# Patient Record
Sex: MALE | Race: WHITE | Employment: UNEMPLOYED | URBAN - METROPOLITAN AREA
[De-identification: names, ages, dates, MRNs, and addresses within clinical notes are randomized per-mention and may not be internally consistent; named-entity substitution may affect disease eponyms.]

---

## 2018-07-26 ENCOUNTER — NURSE TRIAGE (OUTPATIENT)
Dept: NURSING | Facility: CLINIC | Age: 63
End: 2018-07-26

## 2018-07-26 NOTE — TELEPHONE ENCOUNTER
Blood sugars running higher.. Wants to take 1000 mg metformin in the morning and 500 mg in the evening. Blood sugars have been higher since surgery.  I advised that he needs to call and discuss changes with his MD before making changes because they may want him to get more frequent blood sugars to closely monitor his response and to get him a new prescription so he doesn't run out of the medication. He understands and will call the clinic when they open.  Kathleen Wayne RN-[facility]

## 2019-10-01 ENCOUNTER — HEALTH MAINTENANCE LETTER (OUTPATIENT)
Age: 64
End: 2019-10-01

## 2019-12-15 ENCOUNTER — HEALTH MAINTENANCE LETTER (OUTPATIENT)
Age: 64
End: 2019-12-15

## 2020-03-22 ENCOUNTER — HEALTH MAINTENANCE LETTER (OUTPATIENT)
Age: 65
End: 2020-03-22

## 2020-07-17 ENCOUNTER — TELEPHONE (OUTPATIENT)
Dept: NEUROLOGY | Facility: CLINIC | Age: 65
End: 2020-07-17

## 2020-07-17 NOTE — TELEPHONE ENCOUNTER
Called to make his September appt but wanted to know what to do about his Restless leg syndrome. He has only gotten a few hours of sleep the last few nights and feels like he is in a nightmare. Please call to advise at [phone] Thank you!

## 2020-07-20 NOTE — TELEPHONE ENCOUNTER
Spoke with patient and he states that the RLS has worsened to such a bad intensity increased even more so in the last few weeks. He states he is afraid to drive because he is not getting enough/any sleep at night because of the pain. Sometimes rosario enough to get 4 hours or less. He did start taking an otc magnesium supp (states low dose but unsure of strength) has helped minimally but is better. He does not want opiates. Is aware of 6 month follow up in September. Please advise on anything he can try in the mean time   Omid Powell on 7/20/2020 at 1:43 PM

## 2020-07-20 NOTE — TELEPHONE ENCOUNTER
Please call, can try some sinemet for the RLS, I sent eRx to the pharmacy (through [facility]). Thanks.

## 2020-07-20 NOTE — TELEPHONE ENCOUNTER
Spoke with patient and relayed message. He verbalized understanding with no further questions   Omid Powell on 7/20/2020 at 3:05 PM

## 2020-09-09 PROBLEM — R53.81 PHYSICAL DECONDITIONING: Status: ACTIVE | Noted: 2018-01-30

## 2020-09-09 PROBLEM — G25.81 RESTLESS LEGS SYNDROME: Status: ACTIVE | Noted: 2020-09-09

## 2020-09-09 PROBLEM — G25.3 MYOCLONUS: Status: ACTIVE | Noted: 2020-09-09

## 2020-09-09 PROBLEM — M41.50 SCOLIOSIS DUE TO DEGENERATIVE DISEASE OF SPINE IN ADULT PATIENT: Status: ACTIVE | Noted: 2020-09-09

## 2020-09-09 PROBLEM — R20.2 PARESTHESIA: Status: ACTIVE | Noted: 2020-09-09

## 2020-09-09 PROBLEM — M48.061 LUMBAR SPINAL STENOSIS: Status: ACTIVE | Noted: 2018-02-02

## 2020-09-09 PROBLEM — M48.04 THORACIC STENOSIS: Status: ACTIVE | Noted: 2019-09-04

## 2020-09-09 PROBLEM — G57.10 MERALGIA PARESTHETICA: Status: ACTIVE | Noted: 2020-09-09

## 2020-09-09 PROBLEM — M51.379 DDD (DEGENERATIVE DISC DISEASE), LUMBOSACRAL: Status: ACTIVE | Noted: 2020-09-09

## 2020-09-09 PROBLEM — E11.42 DIABETIC POLYNEUROPATHY (H): Status: ACTIVE | Noted: 2020-09-09

## 2020-09-09 PROBLEM — Z98.1 S/P LUMBAR SPINAL FUSION: Status: ACTIVE | Noted: 2020-09-09

## 2020-09-09 PROBLEM — I10 HTN (HYPERTENSION): Status: ACTIVE | Noted: 2018-01-16

## 2020-09-09 PROBLEM — R53.83 FATIGUE: Status: ACTIVE | Noted: 2020-09-09

## 2020-09-09 PROBLEM — R93.89 ABNORMAL MAGNETIC RESONANCE IMAGING STUDY: Status: ACTIVE | Noted: 2020-09-09

## 2020-09-10 ENCOUNTER — OFFICE VISIT (OUTPATIENT)
Dept: NEUROLOGY | Facility: CLINIC | Age: 65
End: 2020-09-10
Payer: COMMERCIAL

## 2020-09-10 VITALS — BODY MASS INDEX: 37.2 KG/M2 | WEIGHT: 237 LBS | HEIGHT: 67 IN

## 2020-09-10 DIAGNOSIS — E11.42 DIABETIC POLYNEUROPATHY ASSOCIATED WITH TYPE 2 DIABETES MELLITUS (H): Primary | ICD-10-CM

## 2020-09-10 DIAGNOSIS — G25.81 RESTLESS LEG SYNDROME: ICD-10-CM

## 2020-09-10 PROCEDURE — 99213 OFFICE O/P EST LOW 20 MIN: CPT | Mod: TEL | Performed by: PSYCHIATRY & NEUROLOGY

## 2020-09-10 RX ORDER — GABAPENTIN 600 MG/1
600 TABLET ORAL AT BEDTIME
Qty: 90 TABLET | Refills: 2 | Status: SHIPPED | OUTPATIENT
Start: 2020-09-10 | End: 2022-11-10

## 2020-09-10 RX ORDER — MULTIVITAMIN WITH IRON
1 TABLET ORAL DAILY
COMMUNITY
End: 2023-12-15

## 2020-09-10 RX ORDER — CARBIDOPA AND LEVODOPA 25; 100 MG/1; MG/1
1 TABLET ORAL 2 TIMES DAILY
Qty: 180 TABLET | Refills: 2 | Status: SHIPPED | OUTPATIENT
Start: 2020-09-10 | End: 2021-06-29

## 2020-09-10 RX ORDER — PRAMIPEXOLE DIHYDROCHLORIDE 0.5 MG/1
0.5 TABLET ORAL 3 TIMES DAILY
Qty: 270 TABLET | Refills: 2 | Status: SHIPPED | OUTPATIENT
Start: 2020-09-10 | End: 2021-06-10

## 2020-09-10 RX ORDER — GABAPENTIN 600 MG/1
1 TABLET ORAL DAILY
COMMUNITY
Start: 2020-08-14 | End: 2020-09-10

## 2020-09-10 RX ORDER — GABAPENTIN 300 MG/1
300 CAPSULE ORAL 2 TIMES DAILY
Qty: 180 CAPSULE | Refills: 2 | Status: SHIPPED | OUTPATIENT
Start: 2020-09-10 | End: 2022-11-10

## 2020-09-10 RX ORDER — GLIPIZIDE 10 MG/1
1 TABLET ORAL DAILY
COMMUNITY
Start: 2020-05-20 | End: 2022-11-10

## 2020-09-10 RX ORDER — CARBIDOPA AND LEVODOPA 25; 100 MG/1; MG/1
1 TABLET ORAL 2 TIMES DAILY
COMMUNITY
Start: 2020-08-30 | End: 2020-09-10

## 2020-09-10 ASSESSMENT — MIFFLIN-ST. JEOR: SCORE: 1828.65

## 2020-09-10 NOTE — LETTER
9/10/2020         RE: Patrick Leal  [address]  [facility] [address]        Dear Colleague,    Thank you for referring your patient, Patrick Leal, to the [facility] [place]. Please see a copy of my visit note below.    [facility]  [place]    Patrick Leal MRN# [id number]   Age: 65 year old YOB: 1955               Assessment and Plan:   Assessment:   Diabetic neuropathy  Restless leg syndrome  History of Hartmann Garrido Guillian Barré syndrome        Plan:     I renewed the Sinemet, gabapentin and pramipexole prescriptions.  Symptoms are doing well, we will plan for follow-up in 6 months.  He sees Ortho on the 21st.             Chief Complaint/HPI:     I spoke to Patrick on the phone today for a telephone visit with his permission.  I last saw him in March of this year.  In July he called us with increasing symptoms of restless legs.  With that I sent in a prescription for carbidopa levodopa 25/10, 1 tablet twice a day.  He had a remarkable improvement in his restless leg symptoms with that.  Today he complains of more lower leg pains.  He has pain in the shins and some knee pain as well.  He has had shin splints in the past and this does feel similar.  He is not having a burning type neuropathic pain.            Past Medical History:    has a past medical history of Abnormality of gait, Allergic rhinitis, cause unspecified, Anxiety state, unspecified, Depressive disorder, not elsewhere classified, DIVERTICULITIS OF COLON , recurrent  [562.11], Esophageal reflux, Generalized osteoarthrosis, unspecified site, Impotence of organic origin, Insomnia, unspecified, Myalgia and myositis, unspecified, Plantar fascial fibromatosis, PONV (postoperative nausea and vomiting), Pure hyperglyceridemia, Unspecified hypothyroidism, and Unspecified tinnitus.          Past Surgical History:    has a past surgical history that includes APPENDECTOMY; LAP,  SURG PROCTOPEXY W/SIG RESECT; Orchiectomy scrotal bilateral (4/29/2011); and Orchiectomy scrotal (10/15/2012).          Social History:     Social History     Tobacco Use     Smoking status: Never Smoker     Smokeless tobacco: Never Used   Substance Use Topics     Alcohol use: Yes     Comment: occ glass of wine              Family History:     Family History   Problem Relation Age of Onset     Heart Surgery Mother      Myocardial Infarction Mother      Heart Surgery Father                 Allergies:     Allergies   Allergen Reactions     Ambien      SLEEP WALKING     Amoxicillin      Stomach upset     Erythromycin      Other reaction(s): GI Upset     Morphine      Other reaction(s): flushed feeling, Flushing  Facial flushing       Zolpidem Other (See Comments)     Other reaction(s): Agitation, Confusion, Hallucinations  SLEEP WALKING  Sleep Walking               Medications:     Current Outpatient Medications:      aspirin 81 MG tablet, Take 81 mg by mouth daily, Disp: , Rfl:      carbidopa-levodopa (SINEMET)  MG tablet, Take 1 tablet by mouth 2 times daily, Disp: 180 tablet, Rfl: 2     Cholecalciferol (VITAMIN D) 2000 UNITS CAPS, Take 1 capsule by mouth daily , Disp: , Rfl:      chromium 200 MCG CAPS, Take 200 mcg by mouth daily., Disp: , Rfl:      cinnamon 500 MG CAPS, Take 1,000 mg by mouth 2 times daily , Disp: , Rfl:      cyanocobalamin (VITAMIN  B-12) 1000 MCG tablet, Take 1,000 mcg by mouth daily, Disp: , Rfl:      diclofenac (VOLTAREN) 1 % GEL, Place 4 g onto the skin 4 times daily, Disp: , Rfl:      DULoxetine (CYMBALTA) 60 MG capsule, Take 2 capsules (120 mg) by mouth daily, Disp: 90 capsule, Rfl: 0     estradiol (VIVELLE-DOT) 0.025 MG/24HR, Place 1 patch onto the skin twice a week, Disp: 8 patch, Rfl: 7     Fexofenadine HCl (ALLEGRA PO), Take 180 mg by mouth daily , Disp: , Rfl:      fluticasone (FLONASE) 50 MCG/ACT nasal spray, Spray 1 spray into both nostrils daily as needed for rhinitis or  allergies, Disp: , Rfl:      fluticasone (FLOVENT DISKUS) 50 MCG/BLIST AEPB, Inhale 1 puff into the lungs as needed , Disp: , Rfl:      gabapentin (NEURONTIN) 300 MG capsule, Take 1 capsule (300 mg) by mouth 2 times daily, Disp: 180 capsule, Rfl: 2     gabapentin (NEURONTIN) 300 MG tablet, Take 300 mg by mouth 2 times daily., Disp: , Rfl:      gabapentin (NEURONTIN) 600 MG tablet, Take 1 tablet (600 mg) by mouth At Bedtime, Disp: 90 tablet, Rfl: 2     glipiZIDE (GLUCOTROL) 10 MG tablet, Take 1 tablet by mouth daily, Disp: , Rfl:      ibuprofen (ADVIL,MOTRIN) 600 MG tablet, Take 1 tablet (600 mg) by mouth every 6 hours as needed for moderate pain, Disp: 20 tablet, Rfl: 1     levothyroxine (SYNTHROID, LEVOTHROID) 150 MCG tablet, Take 150 mcg by mouth daily , Disp: , Rfl:      lidocaine (LIDODERM) 5 % patch, Place 2 patches onto the skin as needed , Disp: , Rfl:      LORAZEPAM PO, Take 1 mg by mouth as needed , Disp: , Rfl:      magnesium 250 MG tablet, Take 1 tablet by mouth daily, Disp: , Rfl:      metFORMIN (GLUCOPHAGE) 1000 MG tablet, Take 1,000 mg by mouth 2 times daily Restart tomorrow (Fri), Disp: 60 tablet, Rfl:      pantoprazole (PROTONIX) 40 MG enteric coated tablet, Take 40 mg by mouth daily, Disp: , Rfl:      pramipexole (MIRAPEX) 0.5 MG tablet, Take 1 tablet (0.5 mg) by mouth 3 times daily, Disp: 270 tablet, Rfl: 2     traZODone (DESYREL) 100 MG tablet, Take 2 tablets by mouth nightly as needed , Disp: , Rfl:      vitamin E 400 UNITS TABS, Take 400 Units by mouth daily, Disp: , Rfl:            Review of Systems:               Physical Exam:   Awake and alert with no aphasia no dysarthria  Speech is clear and coherent  Further exam is not feasible over the phone.    12 minutes were spent on the phone today.      Ben Ovalles MD             Again, thank you for allowing me to participate in the care of your patient.        Sincerely,        Ben Ovalles MD

## 2020-09-10 NOTE — NURSING NOTE
Chief Complaint   Patient presents with     Follow Up     Annual RLS follow up-states bi lateral leg pain in shins and ankles has worsened since last visit      Phone visit [phone]  Omid Powell on 9/10/2020 at 8:34 AM

## 2020-09-14 NOTE — PROGRESS NOTES
[facility] Neurology  [place]    Patrick Leal MRN# [id number]   Age: 65 year old YOB: 1955               Assessment and Plan:   Assessment:   Diabetic neuropathy  Restless leg syndrome  History of Hartmann Garrido Guillian Barré syndrome        Plan:     I renewed the Sinemet, gabapentin and pramipexole prescriptions.  Symptoms are doing well, we will plan for follow-up in 6 months.  He sees Ortho on the 21st.             Chief Complaint/HPI:     I spoke to Patrick on the phone today for a telephone visit with his permission.  I last saw him in March of this year.  In July he called us with increasing symptoms of restless legs.  With that I sent in a prescription for carbidopa levodopa 25/10, 1 tablet twice a day.  He had a remarkable improvement in his restless leg symptoms with that.  Today he complains of more lower leg pains.  He has pain in the shins and some knee pain as well.  He has had shin splints in the past and this does feel similar.  He is not having a burning type neuropathic pain.            Past Medical History:    has a past medical history of Abnormality of gait, Allergic rhinitis, cause unspecified, Anxiety state, unspecified, Depressive disorder, not elsewhere classified, DIVERTICULITIS OF COLON , recurrent  [562.11], Esophageal reflux, Generalized osteoarthrosis, unspecified site, Impotence of organic origin, Insomnia, unspecified, Myalgia and myositis, unspecified, Plantar fascial fibromatosis, PONV (postoperative nausea and vomiting), Pure hyperglyceridemia, Unspecified hypothyroidism, and Unspecified tinnitus.          Past Surgical History:    has a past surgical history that includes APPENDECTOMY; LAP, SURG PROCTOPEXY W/SIG RESECT; Orchiectomy scrotal bilateral (4/29/2011); and Orchiectomy scrotal (10/15/2012).          Social History:     Social History     Tobacco Use     Smoking status: Never Smoker     Smokeless tobacco: Never Used   Substance Use Topics      Alcohol use: Yes     Comment: occ glass of wine              Family History:     Family History   Problem Relation Age of Onset     Heart Surgery Mother      Myocardial Infarction Mother      Heart Surgery Father                 Allergies:     Allergies   Allergen Reactions     Ambien      SLEEP WALKING     Amoxicillin      Stomach upset     Erythromycin      Other reaction(s): GI Upset     Morphine      Other reaction(s): flushed feeling, Flushing  Facial flushing       Zolpidem Other (See Comments)     Other reaction(s): Agitation, Confusion, Hallucinations  SLEEP WALKING  Sleep Walking               Medications:     Current Outpatient Medications:      aspirin 81 MG tablet, Take 81 mg by mouth daily, Disp: , Rfl:      carbidopa-levodopa (SINEMET)  MG tablet, Take 1 tablet by mouth 2 times daily, Disp: 180 tablet, Rfl: 2     Cholecalciferol (VITAMIN D) 2000 UNITS CAPS, Take 1 capsule by mouth daily , Disp: , Rfl:      chromium 200 MCG CAPS, Take 200 mcg by mouth daily., Disp: , Rfl:      cinnamon 500 MG CAPS, Take 1,000 mg by mouth 2 times daily , Disp: , Rfl:      cyanocobalamin (VITAMIN  B-12) 1000 MCG tablet, Take 1,000 mcg by mouth daily, Disp: , Rfl:      diclofenac (VOLTAREN) 1 % GEL, Place 4 g onto the skin 4 times daily, Disp: , Rfl:      DULoxetine (CYMBALTA) 60 MG capsule, Take 2 capsules (120 mg) by mouth daily, Disp: 90 capsule, Rfl: 0     estradiol (VIVELLE-DOT) 0.025 MG/24HR, Place 1 patch onto the skin twice a week, Disp: 8 patch, Rfl: 7     Fexofenadine HCl (ALLEGRA PO), Take 180 mg by mouth daily , Disp: , Rfl:      fluticasone (FLONASE) 50 MCG/ACT nasal spray, Spray 1 spray into both nostrils daily as needed for rhinitis or allergies, Disp: , Rfl:      fluticasone (FLOVENT DISKUS) 50 MCG/BLIST AEPB, Inhale 1 puff into the lungs as needed , Disp: , Rfl:      gabapentin (NEURONTIN) 300 MG capsule, Take 1 capsule (300 mg) by mouth 2 times daily, Disp: 180 capsule, Rfl: 2     gabapentin  (NEURONTIN) 300 MG tablet, Take 300 mg by mouth 2 times daily., Disp: , Rfl:      gabapentin (NEURONTIN) 600 MG tablet, Take 1 tablet (600 mg) by mouth At Bedtime, Disp: 90 tablet, Rfl: 2     glipiZIDE (GLUCOTROL) 10 MG tablet, Take 1 tablet by mouth daily, Disp: , Rfl:      ibuprofen (ADVIL,MOTRIN) 600 MG tablet, Take 1 tablet (600 mg) by mouth every 6 hours as needed for moderate pain, Disp: 20 tablet, Rfl: 1     levothyroxine (SYNTHROID, LEVOTHROID) 150 MCG tablet, Take 150 mcg by mouth daily , Disp: , Rfl:      lidocaine (LIDODERM) 5 % patch, Place 2 patches onto the skin as needed , Disp: , Rfl:      LORAZEPAM PO, Take 1 mg by mouth as needed , Disp: , Rfl:      magnesium 250 MG tablet, Take 1 tablet by mouth daily, Disp: , Rfl:      metFORMIN (GLUCOPHAGE) 1000 MG tablet, Take 1,000 mg by mouth 2 times daily Restart tomorrow (Fri), Disp: 60 tablet, Rfl:      pantoprazole (PROTONIX) 40 MG enteric coated tablet, Take 40 mg by mouth daily, Disp: , Rfl:      pramipexole (MIRAPEX) 0.5 MG tablet, Take 1 tablet (0.5 mg) by mouth 3 times daily, Disp: 270 tablet, Rfl: 2     traZODone (DESYREL) 100 MG tablet, Take 2 tablets by mouth nightly as needed , Disp: , Rfl:      vitamin E 400 UNITS TABS, Take 400 Units by mouth daily, Disp: , Rfl:            Review of Systems:               Physical Exam:   Awake and alert with no aphasia no dysarthria  Speech is clear and coherent  Further exam is not feasible over the phone.    12 minutes were spent on the phone today.      Ben Ovalles MD

## 2020-09-14 NOTE — PATIENT INSTRUCTIONS
Patient Education     Understanding Restless Legs Syndrome    Are you ever annoyed by a creeping or itching feeling in your legs? Do you often feel an urge to move your legs while sitting or lying in bed? This can keep you from falling asleep at night. You may then feel tired during the day. If you have these problems, talk to your healthcare provider. He or she can suggest a treatment plan and help you find ways to sleep better.  Restless legs syndrome (RLS)  RLS is a creeping, crawly, or jumpy feeling in the legs with an urge to move them. Symptoms of RLS often occur during periods of inactivity, such as when you sit or lie down at night. This discomfort can keep you from falling asleep. RLS is more common in older people and tends to run in families. Overuse of caffeine or alcohol may make symptoms worse. Iron deficiency, diabetes, or kidney problems can contribute to RLS.  Periodic limb movement syndrome (PLMS)  PLMS is sudden, repetitive leg jerking during sleep. The person you sleep with is often the one who notices it. Your legs may jerk many times during the night. You and your partner may both have trouble sleeping and feel tired in the morning. PLMS shouldn t be confused with the normal leg or body twitching many people have when first falling asleep.  Treating these problems  If these problems are causing disrupted sleep and daytime symptoms, treatment may be needed. Possible treatments may include:    Avoiding medicines like antidepressants, antinausea medicine, and other medicines that block dopamine    Iron supplements    Prescribed medicines including pramipexole, ropinirole, ritigotine, pregbalin, cabergoline, levodopa, and clonidine    Lifestyle changes, such as regular exercise, controlling caffeine intake, alcohol, and smoking  Date Last Reviewed: 9/1/2017 2000-2019 [employer] [address] [place] [address], [place] [address]. All rights reserved. This information is not intended as a  substitute for professional medical care. Always follow your healthcare professional's instructions.

## 2021-01-15 ENCOUNTER — HEALTH MAINTENANCE LETTER (OUTPATIENT)
Age: 66
End: 2021-01-15

## 2021-03-02 ENCOUNTER — TELEPHONE (OUTPATIENT)
Dept: NEUROLOGY | Facility: CLINIC | Age: 66
End: 2021-03-02

## 2021-03-02 NOTE — TELEPHONE ENCOUNTER
Pt,called to ask if the COVID vaccine was safe for him to get. He has a Hx of GBS.  [phone] ok to lm

## 2021-03-03 NOTE — TELEPHONE ENCOUNTER
I spoke to Patrick on the phone today, I told him we do not have clear data about risk of neuromuscular complications from the Covid vaccine.  It is unlikely he would get problems from it but I cannot say for sure that it would not happen.  I did suggest that if he's worried about it he could skip the vaccine and use precautions such as masking when out and about.  He will think about it.

## 2021-05-15 ENCOUNTER — HEALTH MAINTENANCE LETTER (OUTPATIENT)
Age: 66
End: 2021-05-15

## 2021-06-10 DIAGNOSIS — G25.81 RESTLESS LEG SYNDROME: ICD-10-CM

## 2021-06-10 DIAGNOSIS — E11.42 DIABETIC POLYNEUROPATHY ASSOCIATED WITH TYPE 2 DIABETES MELLITUS (H): ICD-10-CM

## 2021-06-10 RX ORDER — PRAMIPEXOLE DIHYDROCHLORIDE 0.5 MG/1
0.5 TABLET ORAL 3 TIMES DAILY
Qty: 270 TABLET | Refills: 0 | Status: SHIPPED | OUTPATIENT
Start: 2021-06-10 | End: 2021-09-07

## 2021-06-10 NOTE — TELEPHONE ENCOUNTER
Refill request for pramipexole  Medication T'd for review and signature  Devora Ambrose CMA on 6/10/2021 at 11:33 AM

## 2021-06-29 DIAGNOSIS — E11.42 DIABETIC POLYNEUROPATHY ASSOCIATED WITH TYPE 2 DIABETES MELLITUS (H): ICD-10-CM

## 2021-06-29 DIAGNOSIS — G25.81 RESTLESS LEG SYNDROME: ICD-10-CM

## 2021-06-29 RX ORDER — CARBIDOPA AND LEVODOPA 25; 100 MG/1; MG/1
1 TABLET ORAL 2 TIMES DAILY
Qty: 180 TABLET | Refills: 0 | Status: SHIPPED | OUTPATIENT
Start: 2021-06-29 | End: 2021-10-01

## 2021-06-29 NOTE — TELEPHONE ENCOUNTER
Refill request for Sinemet  Medication T'd for review and signature  Devora Ambrose CMA on 6/29/2021 at 9:27 AM

## 2021-09-04 ENCOUNTER — HEALTH MAINTENANCE LETTER (OUTPATIENT)
Age: 66
End: 2021-09-04

## 2021-09-04 DIAGNOSIS — G25.81 RESTLESS LEG SYNDROME: ICD-10-CM

## 2021-09-04 DIAGNOSIS — E11.42 DIABETIC POLYNEUROPATHY ASSOCIATED WITH TYPE 2 DIABETES MELLITUS (H): ICD-10-CM

## 2021-09-04 NOTE — LETTER
9/4/2021        RE: Patrick Leal  [address]  [facility] [address]            Dear Patrick,      .We recently provided you with medication refills.  Many medications require routine follow-up with your doctor. As Dr. Ovalles is no longer at our clinic, you will need to establish care with another neurologist for medication management.     Your prescription(s) have been refilled for 30 days so you may have time for the above noted follow-up. Please call to schedule soon so we can assure you have an appointment before your next refills are needed. If you have already made a follow up appointment, please disregard this letter.           Sincerely,        [facility]     (Formerly known as [facility])

## 2021-09-07 NOTE — TELEPHONE ENCOUNTER
Refill request for Mirapex  Letter mailed to patient to schedule follow up appt  Medication T'd for review and signature  Devora Ambrose CMA on 9/7/2021 at 3:44 PM

## 2021-09-08 RX ORDER — PRAMIPEXOLE DIHYDROCHLORIDE 0.5 MG/1
0.5 TABLET ORAL 3 TIMES DAILY
Qty: 90 TABLET | Refills: 0 | Status: SHIPPED | OUTPATIENT
Start: 2021-09-08 | End: 2022-01-19

## 2021-09-25 DIAGNOSIS — G25.81 RESTLESS LEG SYNDROME: ICD-10-CM

## 2021-09-25 DIAGNOSIS — E11.42 DIABETIC POLYNEUROPATHY ASSOCIATED WITH TYPE 2 DIABETES MELLITUS (H): ICD-10-CM

## 2021-10-01 RX ORDER — CARBIDOPA AND LEVODOPA 25; 100 MG/1; MG/1
1 TABLET ORAL 2 TIMES DAILY
Qty: 60 TABLET | Refills: 0 | Status: SHIPPED | OUTPATIENT
Start: 2021-10-01 | End: 2022-01-19

## 2021-10-01 NOTE — TELEPHONE ENCOUNTER
Carbidopa / Levodopa refill request received 9- for previous Dr. Ovalles patient with last visit 9-. Appointment reminder letter was mailed to patient 9-7-2021. There is no appointment scheduled at this time.  30 day supply with no refills pended.  Medication T'd for review and signature  Alexia Isabel RN on 10/1/2021 at 11:52 AM

## 2021-10-06 ENCOUNTER — TELEPHONE (OUTPATIENT)
Dept: NEUROLOGY | Facility: CLINIC | Age: 66
End: 2021-10-06
Payer: COMMERCIAL

## 2021-10-06 NOTE — TELEPHONE ENCOUNTER
I called pt to schedule with a provider to continue refills, as we got a request from [facility] pharmacy. Pt stated that he is getting neuro care elsewhere. Pt will let [facility] know not to send requests to us.

## 2021-12-19 ENCOUNTER — HEALTH MAINTENANCE LETTER (OUTPATIENT)
Age: 66
End: 2021-12-19

## 2022-01-17 NOTE — TELEPHONE ENCOUNTER
RECORDS RECEIVED FROM: Self    REASON FOR VISIT: RLS   Date of Appt: 1/19/22   NOTES (FOR ALL VISITS) STATUS DETAILS   OFFICE NOTE from referring provider N/A    OFFICE NOTE from other specialist Internal Dr Ben Ovalles @ [facility] [place]:  9/10/20   DISCHARGE SUMMARY from hospital N/A    DISCHARGE REPORT from the ER N/A    OPERATIVE REPORT N/A    MEDICATION LIST Internal    IMAGING  (FOR ALL VISITS)     EMG N/A    EEG N/A    LUMBAR PUNCTURE N/A    PEPITO SCAN N/A    ULTRASOUND (CAROTID BILAT) *VASCULAR* N/A    MRI (HEAD, NECK, SPINE) Received [facility]:  MRI Thoracic Spine 10/23/15  MRI Brain 10/22/15  MRI Brain 10/17/15   CT (HEAD, NECK, SPINE) Received [facility]:  CT Thoracic Spine 9/14/19  CT Lumbar Spine 9/14/19  CT Thoracic Spine 9/8/19    [facility]:  CTA Head 10/17/15  CT Head 10/17/15      Action 1/17/22 MV 2.40pm   Action Taken Imaging request faxed to [facility]    --1/18/22 MV 6.49am--  Images resolved in PACS

## 2022-01-19 ENCOUNTER — OFFICE VISIT (OUTPATIENT)
Dept: NEUROLOGY | Facility: CLINIC | Age: 67
End: 2022-01-19
Payer: COMMERCIAL

## 2022-01-19 ENCOUNTER — PRE VISIT (OUTPATIENT)
Dept: NEUROLOGY | Facility: CLINIC | Age: 67
End: 2022-01-19

## 2022-01-19 VITALS — RESPIRATION RATE: 16 BRPM

## 2022-01-19 DIAGNOSIS — G25.81 RESTLESS LEG SYNDROME: ICD-10-CM

## 2022-01-19 DIAGNOSIS — E11.42 DIABETIC POLYNEUROPATHY ASSOCIATED WITH TYPE 2 DIABETES MELLITUS (H): ICD-10-CM

## 2022-01-19 PROCEDURE — 99215 OFFICE O/P EST HI 40 MIN: CPT | Performed by: PSYCHIATRY & NEUROLOGY

## 2022-01-19 RX ORDER — LANCETS
EACH MISCELLANEOUS
COMMUNITY
Start: 2021-12-11

## 2022-01-19 RX ORDER — CALCIUM CARBONATE 1250 MG/5ML
SUSPENSION ORAL
COMMUNITY
End: 2024-01-18

## 2022-01-19 RX ORDER — BLOOD SUGAR DIAGNOSTIC
STRIP MISCELLANEOUS
COMMUNITY
Start: 2021-12-10

## 2022-01-19 RX ORDER — DIAZEPAM 5 MG
TABLET ORAL
COMMUNITY
Start: 2021-02-17 | End: 2024-01-25

## 2022-01-19 RX ORDER — ASPIRIN 81 MG/1
81 TABLET ORAL DAILY
COMMUNITY

## 2022-01-19 RX ORDER — CARBIDOPA AND LEVODOPA 25; 100 MG/1; MG/1
1 TABLET ORAL 2 TIMES DAILY
Qty: 180 TABLET | Refills: 3 | Status: SHIPPED | OUTPATIENT
Start: 2022-01-19 | End: 2022-11-10

## 2022-01-19 RX ORDER — PRAMIPEXOLE DIHYDROCHLORIDE 0.5 MG/1
TABLET ORAL
Qty: 270 TABLET | Refills: 3 | Status: SHIPPED | OUTPATIENT
Start: 2022-01-19 | End: 2022-11-10

## 2022-01-19 RX ORDER — LOSARTAN POTASSIUM 25 MG/1
25 TABLET ORAL DAILY
COMMUNITY
Start: 2021-04-22 | End: 2022-11-10

## 2022-01-19 RX ORDER — BLOOD-GLUCOSE METER
EACH MISCELLANEOUS
COMMUNITY
Start: 2021-12-11

## 2022-01-19 RX ORDER — ATORVASTATIN CALCIUM 10 MG/1
1 TABLET, FILM COATED ORAL DAILY
COMMUNITY
Start: 2021-12-07

## 2022-01-19 ASSESSMENT — PAIN SCALES - GENERAL: PAINLEVEL: NO PAIN (0)

## 2022-01-19 NOTE — PATIENT INSTRUCTIONS
I have sent in prescriptions for pramipexole and carbidopa/levodopa for your restless legs.    We also discussed the fact that poor blood sugar control can worsen your restless legs symptoms and exacerbate peripheral neuropathy, so working hard on your diabetes control should also help your restless legs syndrome.

## 2022-01-19 NOTE — PROGRESS NOTES
[facility]  Movement Disorders Division   Initial Clinic Evaluation     Patient: Patrick Leal   MRN: [id number]   : 1955   Date of Visit: 2022     Referring Physician: ***    Dear  ***, Thank you for your referral of Patrick Leal for ***    History of Present Illness  Mr. 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 He has previously been followed by Dr. Ovalles for his RLS and treated with a combination of gabapentin, pramipexole, and Sinemet.     Had Guillain-[place] in , requiring a 10 day hospitalization followed by rehab for 2 weeks. He presented with facial weakness, and lower extremity weakness. Prior to the GBS he was walking at least 5 miles per day, but since then he has not been able to walk more than a mile, or less with his        He has had restless legs syndrome since around , but he did not start treatment for it until after he had Guillain [place]. He was initially started on pramipexole.  Carbidopa/levodopa was added about 1-1.5 years ago when he had worsening of his restless legs again.    He was initially on gabapentin for his back pain, but the bedtime dose was increase for RLS.     He ran out of his pramipexole and carbidopa/levodopa 2-3 weeks ago and has been really struggling with his RLS since then with severe difficulty sleeping. He is only sleeping 2-3 hours per night.      On this current medication regimen he still has some bad nights, but in general will get 4-6 hours of sleep. RLS symptoms may start any time after dinner, but usually start 930-10PM. The bedtime medication reduces his symptoms but does not take them away.     He will be having a lumbar back surgery on  to repair a jerrica.     Review of Systems:  Other than that noted at the end of this note, the remainder of 12 systems reviewed were negative.    Medications:  Current Outpatient Medications    Medication Sig Dispense Refill     aspirin 81 MG tablet Take 81 mg by mouth daily       carbidopa-levodopa (SINEMET)  MG tablet Take 1 tablet by mouth 2 times daily. 60 tablet 0     Cholecalciferol (VITAMIN D) 2000 UNITS CAPS Take 1 capsule by mouth daily        chromium 200 MCG CAPS Take 200 mcg by mouth daily.       cinnamon 500 MG CAPS Take 1,000 mg by mouth 2 times daily        cyanocobalamin (VITAMIN  B-12) 1000 MCG tablet Take 1,000 mcg by mouth daily       diclofenac (VOLTAREN) 1 % GEL Place 4 g onto the skin 4 times daily       DULoxetine (CYMBALTA) 60 MG capsule Take 2 capsules (120 mg) by mouth daily 90 capsule 0     estradiol (VIVELLE-DOT) 0.025 MG/24HR Place 1 patch onto the skin twice a week 8 patch 7     Fexofenadine HCl (ALLEGRA PO) Take 180 mg by mouth daily        fluticasone (FLONASE) 50 MCG/ACT nasal spray Spray 1 spray into both nostrils daily as needed for rhinitis or allergies       fluticasone (FLOVENT DISKUS) 50 MCG/BLIST AEPB Inhale 1 puff into the lungs as needed        gabapentin (NEURONTIN) 300 MG capsule Take 1 capsule (300 mg) by mouth 2 times daily 180 capsule 2     gabapentin (NEURONTIN) 300 MG tablet Take 300 mg by mouth 2 times daily.       gabapentin (NEURONTIN) 600 MG tablet Take 1 tablet (600 mg) by mouth At Bedtime 90 tablet 2     glipiZIDE (GLUCOTROL) 10 MG tablet Take 1 tablet by mouth daily       ibuprofen (ADVIL,MOTRIN) 600 MG tablet Take 1 tablet (600 mg) by mouth every 6 hours as needed for moderate pain 20 tablet 1     levothyroxine (SYNTHROID, LEVOTHROID) 150 MCG tablet Take 150 mcg by mouth daily        lidocaine (LIDODERM) 5 % patch Place 2 patches onto the skin as needed        LORAZEPAM PO Take 1 mg by mouth as needed        magnesium 250 MG tablet Take 1 tablet by mouth daily       metFORMIN (GLUCOPHAGE) 1000 MG tablet Take 1,000 mg by mouth 2 times daily Restart tomorrow (Fri) 60 tablet      pantoprazole (PROTONIX) 40 MG enteric coated tablet Take 40 mg by  mouth daily       pramipexole (MIRAPEX) 0.5 MG tablet Take 1 tablet (0.5 mg) by mouth 3 times daily PLEASE CALL TO SCHEDULE FOLLOW UP APPT 90 tablet 0     traZODone (DESYREL) 100 MG tablet Take 2 tablets by mouth nightly as needed        vitamin E 400 UNITS TABS Take 400 Units by mouth daily            Movement Disorder-related Medications                   9:30AM PM 1PM At bedtime (10AM)    Pramipexole 0.5mg                                                1   2    Gabapentin 300mg  1  1 (often forgets)         Gabapentin 600mg                   1    Carbidopa/levodopa IR 25/100 1  1 1    Duloxetine 60mg 2             Allergies: is allergic to ambien, amoxicillin, erythromycin, morphine, and zolpidem.    Past Medical History:   Past Medical History:   Diagnosis Date     Abnormality of gait      Allergic rhinitis, cause unspecified      Anxiety state, unspecified      Depressive disorder, not elsewhere classified      DIVERTICULITIS OF COLON , recurrent  [562.11]      Esophageal reflux     Question of     Generalized osteoarthrosis, unspecified site      Impotence of organic origin     on testosterone tx     Insomnia, unspecified      Myalgia and myositis, unspecified      Plantar fascial fibromatosis      PONV (postoperative nausea and vomiting)      Pure hyperglyceridemia      Unspecified hypothyroidism      Unspecified tinnitus        Past Surgical History:   Past Surgical History:   Procedure Laterality Date     ORCHIECTOMY SCROTAL  10/15/2012    Procedure: ORCHIECTOMY SCROTAL;  Scrotal Resection ;  Surgeon: Kang Guajardo MD;  Location: [facility] OR     ORCHIECTOMY SCROTAL BILATERAL  4/29/2011    Procedure:ORCHIECTOMY SCROTAL BILATERAL; Surgeon requests Choice; Surgeon:KANG GUAJARDO; Location: OR     [facility] APPENDECTOMY       ZC LAP, SURG PROCTOPEXY W/SIG RESECT      sigmoid resection for diverticulitis       Social History:   Social History     Socioeconomic History     Marital status:      Spouse name:  Not on file     Number of children: Not on file     Years of education: Not on file     Highest education level: Not on file   Occupational History     Not on file   Tobacco Use     Smoking status: Never Smoker     Smokeless tobacco: Never Used   Substance and Sexual Activity     Alcohol use: Yes     Comment: occ glass of wine      Drug use: No     Sexual activity: Yes     Partners: Female   Other Topics Concern     Parent/sibling w/ CABG, MI or angioplasty before 65F 55M? 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 On son in [place] (due to return home early 2008)    Amish: Mormonism    Pets: 2 dogs     Social Determinants of Health     Financial Resource Strain: Not on file   Food Insecurity: Not on file   Transportation Needs: Not on file   Physical Activity: Not on file   Stress: Not on file   Social Connections: Not on file   Intimate Partner Violence: Not on file   Housing Stability: Not on file       Family History:  Family History   Problem Relation Age of Onset     Heart Surgery Mother      Myocardial Infarction Mother      Heart Surgery Father             Physical Exam:  The patient's  vitals were not taken for this visit.    Neurological Examination:     Full strength in all four extremities.   Normal tone.  Gait is cautious and somewhat antalgic.    Absent reflexes at biceps, trace at left brachioradialis. Absent in knees, ankles.     Data Reviewed:        Impression:  Patrick Leal is a 66 year old male with ***     Recommendations:     Time Spent: *** minutes spent on the date of the encounter doing chart review, history and exam, documentation and further activities as noted above    CC: ***    Thank you for the opportunity to share in the care of this patient.  Please feel free to contact me if you have any further questions or comments.    Sincerely,    Anika Wilson MD

## 2022-01-19 NOTE — LETTER
2022       RE: Patrick Leal  [address]  [facility] [address]     Dear Colleague,    Thank you for referring your patient, Patrick Leal, to the [facility] [place] at [facility]. Please see a copy of my visit note below.    Department of Neurology  Movement Disorders Division   Initial Clinic Evaluation     Patient: Patrick Leal   MRN: [id number]   : 1955   Date of Visit: 2022     Chief Complaint: Establishing care for restless legs syndrome    History of Present Illness  Mr. 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 He has previously been followed by Dr. Ovalles for his RLS and treated with a combination of gabapentin, pramipexole, and Sinemet.     Had Guillain-[place] in , requiring a 10 day hospitalization followed by rehab for 2 weeks. He presented with facial weakness, and lower extremity weakness. Prior to the GBS he was walking at least 5 miles per day, but since then he has not been able to walk more than a mile, or less with his      He has had restless legs syndrome since around , but he did not start treatment for it until after he had Guillain [place]. He was initially started on pramipexole.  Carbidopa/levodopa was added about 1-1.5 years ago when he had worsening of his restless legs again.    He was initially on gabapentin for his back pain, but the bedtime dose was increase for RLS.     He ran out of his pramipexole and carbidopa/levodopa 2-3 weeks ago and has been really struggling with his RLS since then with severe difficulty sleeping. He is only sleeping 2-3 hours per night.      On this current medication regimen he still has some bad nights, but in general will get 4-6 hours of sleep. RLS symptoms may start any time after dinner, but usually start 930-10PM. The bedtime medication reduces his  symptoms but does not take them away.     He will be having a lumbar back surgery on 1/21 to repair a jerrica.     Review of Systems:  Other than that noted at the end of this note, the remainder of 12 systems reviewed were negative.    Medications:  Current Outpatient Medications   Medication Sig Dispense Refill     aspirin 81 MG EC tablet Take 81 mg by mouth daily       atorvastatin (LIPITOR) 10 MG tablet Take 1 tablet by mouth daily       blood glucose (OPTIUM TEST STRIPS) test strip Dispense item covered by pt ins. E11.9 NIDDM type II - Test 1 time/day       blood glucose monitoring (ACCU-CHEK FASTCLIX) lancets Use as directed. Test 1 times per day.       calcium carbonate 1250 MG/5ML SUSP suspension        carbidopa-levodopa (SINEMET)  MG tablet Take 1 tablet by mouth 2 times daily 180 tablet 3     Cholecalciferol (VITAMIN D) 2000 UNITS CAPS Take 1 capsule by mouth daily        chromium 200 MCG CAPS Take 200 mcg by mouth daily.       cinnamon 500 MG CAPS Take 1,000 mg by mouth 2 times daily        Contour Next EZ (CONTOUR NEXT EZ W/DEVICE KIT) w/Device KIT USE TO TEST BLOOD SUGARS 1 TIME DAILY.  E11.9 NIDDM TYPE II       diazepam (VALIUM) 5 MG tablet Take 1 tablet by mouth 2 times daily if needed for Muscle Spasm.       diclofenac (VOLTAREN) 1 % GEL Place 4 g onto the skin 4 times daily       DULoxetine (CYMBALTA) 60 MG capsule Take 2 capsules (120 mg) by mouth daily 90 capsule 0     estradiol (VIVELLE-DOT) 0.025 MG/24HR Place 1 patch onto the skin twice a week 8 patch 7     Fexofenadine HCl (ALLEGRA PO) Take 180 mg by mouth daily        fluticasone (FLONASE) 50 MCG/ACT nasal spray Spray 1 spray into both nostrils daily as needed for rhinitis or allergies       fluticasone (FLOVENT DISKUS) 50 MCG/BLIST AEPB Inhale 1 puff into the lungs as needed        gabapentin (NEURONTIN) 300 MG capsule Take 1 capsule (300 mg) by mouth 2 times daily 180 capsule 2     gabapentin (NEURONTIN) 600 MG tablet Take 1 tablet (600  mg) by mouth At Bedtime 90 tablet 2     glipiZIDE (GLUCOTROL) 10 MG tablet Take 1 tablet by mouth daily       levothyroxine (SYNTHROID, LEVOTHROID) 150 MCG tablet Take 150 mcg by mouth daily        lidocaine (LIDODERM) 5 % patch Place 2 patches onto the skin as needed        LORAZEPAM PO Take 1 mg by mouth as needed        losartan (COZAAR) 25 MG tablet Take 25 mg by mouth daily       magnesium 250 MG tablet Take 1 tablet by mouth daily       metFORMIN (GLUCOPHAGE) 1000 MG tablet Take 1,000 mg by mouth 2 times daily Restart tomorrow (Fri) 60 tablet      pantoprazole (PROTONIX) 40 MG enteric coated tablet Take 40 mg by mouth daily       pramipexole (MIRAPEX) 0.5 MG tablet Take 1 tab po 1AM and 2 tabs po qhs 270 tablet 3     traZODone (DESYREL) 100 MG tablet Take 2 tablets by mouth nightly as needed        vitamin E 400 UNITS TABS Take 400 Units by mouth daily            Movement Disorder-related Medications                   9:30AM PM 1PM At bedtime (10AM)    Pramipexole 0.5mg                                                1   2    Gabapentin 300mg  1  1 (often forgets)         Gabapentin 600mg                   1    Carbidopa/levodopa IR 25/100 1  1 1    Duloxetine 60mg 2             Allergies: is allergic to ambien, amoxicillin, bacitracin, erythromycin, morphine, and zolpidem.    Past Medical History:   Past Medical History:   Diagnosis Date     Abnormality of gait      Allergic rhinitis, cause unspecified      Anxiety state, unspecified      Depressive disorder, not elsewhere classified      DIVERTICULITIS OF COLON , recurrent  [562.11]      Esophageal reflux     Question of     Generalized osteoarthrosis, unspecified site      Impotence of organic origin     on testosterone tx     Insomnia, unspecified      Myalgia and myositis, unspecified      Plantar fascial fibromatosis      PONV (postoperative nausea and vomiting)      Pure hyperglyceridemia      Unspecified hypothyroidism      Unspecified tinnitus         Past Surgical History:   Past Surgical History:   Procedure Laterality Date     ORCHIECTOMY SCROTAL  10/15/2012    Procedure: ORCHIECTOMY SCROTAL;  Scrotal Resection ;  Surgeon: Kang Guajardo MD;  Location: [facility] OR     ORCHIECTOMY SCROTAL BILATERAL  4/29/2011    Procedure:ORCHIECTOMY SCROTAL BILATERAL; Surgeon requests Choice; Surgeon:KANG GUAJARDO; Location:[facility] OR     [facility]C APPENDECTOMY       ZZC LAP, SURG PROCTOPEXY W/SIG RESECT      sigmoid resection for diverticulitis       Social History:   Social History     Socioeconomic History     Marital status:      Spouse name: Not on file     Number of children: Not on file     Years of education: Not on file     Highest education level: Not on file   Occupational History     Not on file   Tobacco Use     Smoking status: Never Smoker     Smokeless tobacco: Never Used   Substance and Sexual Activity     Alcohol use: Yes     Comment: occ glass of wine      Drug use: No     Sexual activity: Yes     Partners: Female   Other Topics Concern     Parent/sibling w/ CABG, MI or angioplasty before 65F 55M? 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 On son in [place] (due to return home early 2008)    Mandaeism: Buddhist    Pets: 2 dogs     Social Determinants of Health     Financial Resource Strain: Not on file   Food Insecurity: Not on file   Transportation Needs: Not on file   Physical Activity: Not on file   Stress: Not on file   Social Connections: Not on file   Intimate Partner Violence: Not on file   Housing Stability: Not on file       Family History:  Family History   Problem Relation Age of Onset     Heart Surgery Mother      Myocardial Infarction Mother      Heart Surgery Father             Physical Exam:  The patient's  respiration is 16.    Neurological Examination:   He is alert and oriented and has fluent speech without dysarthria and is able to provide an interval medical history. Extraocular  movements are full. He has normal smooth pursuits and saccades. His face is symmetric with equal activation.   Full strength in all four extremities. No involuntary movements on exam today.   Normal tone.  Gait is cautious and somewhat antalgic.    Absent reflexes at biceps, trace at left brachioradialis. Absent in knees, ankles.     Data Reviewed: Medical records from evaluation with Dr. Ovalles and Dr. Osorio were reviewed.        Impression:  Mr. 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 He has previously been followed by Dr. Ovalles (who recently retired) for his RLS and treated with a combination of gabapentin, pramipexole, and Sinemet. He ran out of his medications 2-3 weeks ago and has been struggling with his RLS.      Recommendations (as provided to the patient):   I have sent in prescriptions for pramipexole and carbidopa/levodopa for your restless legs.    We also discussed the fact that poor blood sugar control can worsen your restless legs symptoms and exacerbate peripheral neuropathy, so working hard on your diabetes control should also help your restless legs syndrome.     Time Spent: 62 minutes spent on the date of the encounter doing chart review, history and exam, documentation and further activities as noted above      Sincerely,    Anika Wilson MD

## 2022-01-25 NOTE — PROGRESS NOTES
Department of Neurology  Movement Disorders Division   Initial Clinic Evaluation     Patient: Patrick Leal   MRN: [id number]   : 1955   Date of Visit: 2022     Chief Complaint: Establishing care for restless legs syndrome    History of Present Illness  Mr. 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 He has previously been followed by Dr. Ovalles for his RLS and treated with a combination of gabapentin, pramipexole, and Sinemet.     Had Guillain-[place] in , requiring a 10 day hospitalization followed by rehab for 2 weeks. He presented with facial weakness, and lower extremity weakness. Prior to the GBS he was walking at least 5 miles per day, but since then he has not been able to walk more than a mile, or less with his      He has had restless legs syndrome since around , but he did not start treatment for it until after he had Guillain [place]. He was initially started on pramipexole.  Carbidopa/levodopa was added about 1-1.5 years ago when he had worsening of his restless legs again.    He was initially on gabapentin for his back pain, but the bedtime dose was increase for RLS.     He ran out of his pramipexole and carbidopa/levodopa 2-3 weeks ago and has been really struggling with his RLS since then with severe difficulty sleeping. He is only sleeping 2-3 hours per night.      On this current medication regimen he still has some bad nights, but in general will get 4-6 hours of sleep. RLS symptoms may start any time after dinner, but usually start 930-10PM. The bedtime medication reduces his symptoms but does not take them away.     He will be having a lumbar back surgery on  to repair a jerrica.     Review of Systems:  Other than that noted at the end of this note, the remainder of 12 systems reviewed were negative.    Medications:  Current Outpatient Medications   Medication Sig Dispense Refill     aspirin 81 MG  EC tablet Take 81 mg by mouth daily       atorvastatin (LIPITOR) 10 MG tablet Take 1 tablet by mouth daily       blood glucose (OPTIUM TEST STRIPS) test strip Dispense item covered by pt ins. E11.9 NIDDM type II - Test 1 time/day       blood glucose monitoring (ACCU-CHEK FASTCLIX) lancets Use as directed. Test 1 times per day.       calcium carbonate 1250 MG/5ML SUSP suspension        carbidopa-levodopa (SINEMET)  MG tablet Take 1 tablet by mouth 2 times daily 180 tablet 3     Cholecalciferol (VITAMIN D) 2000 UNITS CAPS Take 1 capsule by mouth daily        chromium 200 MCG CAPS Take 200 mcg by mouth daily.       cinnamon 500 MG CAPS Take 1,000 mg by mouth 2 times daily        Contour Next EZ (CONTOUR NEXT EZ W/DEVICE KIT) w/Device KIT USE TO TEST BLOOD SUGARS 1 TIME DAILY.  E11.9 NIDDM TYPE II       diazepam (VALIUM) 5 MG tablet Take 1 tablet by mouth 2 times daily if needed for Muscle Spasm.       diclofenac (VOLTAREN) 1 % GEL Place 4 g onto the skin 4 times daily       DULoxetine (CYMBALTA) 60 MG capsule Take 2 capsules (120 mg) by mouth daily 90 capsule 0     estradiol (VIVELLE-DOT) 0.025 MG/24HR Place 1 patch onto the skin twice a week 8 patch 7     Fexofenadine HCl (ALLEGRA PO) Take 180 mg by mouth daily        fluticasone (FLONASE) 50 MCG/ACT nasal spray Spray 1 spray into both nostrils daily as needed for rhinitis or allergies       fluticasone (FLOVENT DISKUS) 50 MCG/BLIST AEPB Inhale 1 puff into the lungs as needed        gabapentin (NEURONTIN) 300 MG capsule Take 1 capsule (300 mg) by mouth 2 times daily 180 capsule 2     gabapentin (NEURONTIN) 600 MG tablet Take 1 tablet (600 mg) by mouth At Bedtime 90 tablet 2     glipiZIDE (GLUCOTROL) 10 MG tablet Take 1 tablet by mouth daily       levothyroxine (SYNTHROID, LEVOTHROID) 150 MCG tablet Take 150 mcg by mouth daily        lidocaine (LIDODERM) 5 % patch Place 2 patches onto the skin as needed        LORAZEPAM PO Take 1 mg by mouth as needed         losartan (COZAAR) 25 MG tablet Take 25 mg by mouth daily       magnesium 250 MG tablet Take 1 tablet by mouth daily       metFORMIN (GLUCOPHAGE) 1000 MG tablet Take 1,000 mg by mouth 2 times daily Restart tomorrow (Fri) 60 tablet      pantoprazole (PROTONIX) 40 MG enteric coated tablet Take 40 mg by mouth daily       pramipexole (MIRAPEX) 0.5 MG tablet Take 1 tab po 1AM and 2 tabs po qhs 270 tablet 3     traZODone (DESYREL) 100 MG tablet Take 2 tablets by mouth nightly as needed        vitamin E 400 UNITS TABS Take 400 Units by mouth daily            Movement Disorder-related Medications                   9:30AM PM 1PM At bedtime (10AM)    Pramipexole 0.5mg                                                1   2    Gabapentin 300mg  1  1 (often forgets)         Gabapentin 600mg                   1    Carbidopa/levodopa IR 25/100 1  1 1    Duloxetine 60mg 2             Allergies: is allergic to ambien, amoxicillin, bacitracin, erythromycin, morphine, and zolpidem.    Past Medical History:   Past Medical History:   Diagnosis Date     Abnormality of gait      Allergic rhinitis, cause unspecified      Anxiety state, unspecified      Depressive disorder, not elsewhere classified      DIVERTICULITIS OF COLON , recurrent  [562.11]      Esophageal reflux     Question of     Generalized osteoarthrosis, unspecified site      Impotence of organic origin     on testosterone tx     Insomnia, unspecified      Myalgia and myositis, unspecified      Plantar fascial fibromatosis      PONV (postoperative nausea and vomiting)      Pure hyperglyceridemia      Unspecified hypothyroidism      Unspecified tinnitus        Past Surgical History:   Past Surgical History:   Procedure Laterality Date     ORCHIECTOMY SCROTAL  10/15/2012    Procedure: ORCHIECTOMY SCROTAL;  Scrotal Resection ;  Surgeon: Juan Guajardo MD;  Location: [facility] OR     ORCHIECTOMY SCROTAL BILATERAL  4/29/2011    Procedure:ORCHIECTOMY SCROTAL BILATERAL; Surgeon requests  Choice; Surgeon:KANG PIERRE; Location:[facility] OR     ZZC APPENDECTOMY       ZZC LAP, SURG PROCTOPEXY W/SIG RESECT      sigmoid resection for diverticulitis       Social History:   Social History     Socioeconomic History     Marital status:      Spouse name: Not on file     Number of children: Not on file     Years of education: Not on file     Highest education level: Not on file   Occupational History     Not on file   Tobacco Use     Smoking status: Never Smoker     Smokeless tobacco: Never Used   Substance and Sexual Activity     Alcohol use: Yes     Comment: occ glass of wine      Drug use: No     Sexual activity: Yes     Partners: Female   Other Topics Concern     Parent/sibling w/ CABG, MI or angioplasty before 65F 55M? 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 On son in [place] (due to return home early 2008)    Voodoo: Mandaen    Pets: 2 dogs     Social Determinants of Health     Financial Resource Strain: Not on file   Food Insecurity: Not on file   Transportation Needs: Not on file   Physical Activity: Not on file   Stress: Not on file   Social Connections: Not on file   Intimate Partner Violence: Not on file   Housing Stability: Not on file       Family History:  Family History   Problem Relation Age of Onset     Heart Surgery Mother      Myocardial Infarction Mother      Heart Surgery Father             Physical Exam:  The patient's  respiration is 16.    Neurological Examination:   He is alert and oriented and has fluent speech without dysarthria and is able to provide an interval medical history. Extraocular movements are full. He has normal smooth pursuits and saccades. His face is symmetric with equal activation.   Full strength in all four extremities. No involuntary movements on exam today.   Normal tone.  Gait is cautious and somewhat antalgic.    Absent reflexes at biceps, trace at left brachioradialis. Absent in knees, ankles.      Data Reviewed: Medical records from evaluation with Dr. Ovalles and Dr. Osorio were reviewed.        Impression:  Mr. 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 He has previously been followed by Dr. Ovalles (who recently retired) for his RLS and treated with a combination of gabapentin, pramipexole, and Sinemet. He ran out of his medications 2-3 weeks ago and has been struggling with his RLS.      Recommendations (as provided to the patient):   I have sent in prescriptions for pramipexole and carbidopa/levodopa for your restless legs.    We also discussed the fact that poor blood sugar control can worsen your restless legs symptoms and exacerbate peripheral neuropathy, so working hard on your diabetes control should also help your restless legs syndrome.     Time Spent: 62 minutes spent on the date of the encounter doing chart review, history and exam, documentation and further activities as noted above      Sincerely,    Anika Wilson MD

## 2022-02-13 ENCOUNTER — HEALTH MAINTENANCE LETTER (OUTPATIENT)
Age: 67
End: 2022-02-13

## 2022-02-21 RX ORDER — HEPARIN SODIUM,PORCINE 10 UNIT/ML
5 VIAL (ML) INTRAVENOUS
Status: CANCELLED | OUTPATIENT
Start: 2022-02-22

## 2022-02-21 RX ORDER — HEPARIN SODIUM (PORCINE) LOCK FLUSH IV SOLN 100 UNIT/ML 100 UNIT/ML
5 SOLUTION INTRAVENOUS
Status: CANCELLED | OUTPATIENT
Start: 2022-02-22

## 2022-02-22 ENCOUNTER — INFUSION THERAPY VISIT (OUTPATIENT)
Dept: INFUSION THERAPY | Facility: CLINIC | Age: 67
End: 2022-02-22
Attending: FAMILY MEDICINE
Payer: COMMERCIAL

## 2022-02-22 DIAGNOSIS — M41.50 SCOLIOSIS DUE TO DEGENERATIVE DISEASE OF SPINE IN ADULT PATIENT: Primary | ICD-10-CM

## 2022-02-22 LAB
ALBUMIN SERPL-MCNC: 2.3 G/DL (ref 3.5–5)
ALP SERPL-CCNC: 92 U/L (ref 45–120)
ALT SERPL W P-5'-P-CCNC: <9 U/L (ref 0–45)
ANION GAP SERPL CALCULATED.3IONS-SCNC: 12 MMOL/L (ref 5–18)
AST SERPL W P-5'-P-CCNC: 17 U/L (ref 0–40)
BILIRUB SERPL-MCNC: 0.5 MG/DL (ref 0–1)
BUN SERPL-MCNC: 6 MG/DL (ref 8–22)
C REACTIVE PROTEIN LHE: 12.1 MG/DL (ref 0–0.8)
CALCIUM SERPL-MCNC: 8.9 MG/DL (ref 8.5–10.5)
CHLORIDE BLD-SCNC: 96 MMOL/L (ref 98–107)
CO2 SERPL-SCNC: 24 MMOL/L (ref 22–31)
CREAT SERPL-MCNC: 0.69 MG/DL (ref 0.7–1.3)
ERYTHROCYTE [SEDIMENTATION RATE] IN BLOOD BY WESTERGREN METHOD: 102 MM/HR (ref 0–15)
GFR SERPL CREATININE-BSD FRML MDRD: >90 ML/MIN/1.73M2
GLUCOSE BLD-MCNC: 143 MG/DL (ref 70–125)
POTASSIUM BLD-SCNC: 3.4 MMOL/L (ref 3.5–5)
PROT SERPL-MCNC: 7.1 G/DL (ref 6–8)
SODIUM SERPL-SCNC: 132 MMOL/L (ref 136–145)

## 2022-02-22 PROCEDURE — 36415 COLL VENOUS BLD VENIPUNCTURE: CPT

## 2022-02-22 PROCEDURE — 96366 THER/PROPH/DIAG IV INF ADDON: CPT

## 2022-02-22 PROCEDURE — 86140 C-REACTIVE PROTEIN: CPT

## 2022-02-22 PROCEDURE — 96365 THER/PROPH/DIAG IV INF INIT: CPT

## 2022-02-22 PROCEDURE — 85652 RBC SED RATE AUTOMATED: CPT

## 2022-02-22 PROCEDURE — 258N000003 HC RX IP 258 OP 636: Performed by: INTERNAL MEDICINE

## 2022-02-22 PROCEDURE — 80053 COMPREHEN METABOLIC PANEL: CPT

## 2022-02-22 PROCEDURE — 250N000009 HC RX 250: Performed by: INTERNAL MEDICINE

## 2022-02-22 RX ORDER — HEPARIN SODIUM,PORCINE 10 UNIT/ML
5 VIAL (ML) INTRAVENOUS
Status: CANCELLED | OUTPATIENT
Start: 2022-02-23

## 2022-02-22 RX ORDER — HEPARIN SODIUM (PORCINE) LOCK FLUSH IV SOLN 100 UNIT/ML 100 UNIT/ML
5 SOLUTION INTRAVENOUS
Status: CANCELLED | OUTPATIENT
Start: 2022-02-23

## 2022-02-22 RX ADMIN — NAFCILLIN 12 G: 10 INJECTION, POWDER, FOR SOLUTION INTRAVENOUS at 13:41

## 2022-02-22 NOTE — PROGRESS NOTES
Infusion Nursing Note:  Patrick Leal presents today for Naficillin amb. Pump and labs.    Patient seen by provider today: No   present during visit today: Not Applicable.    Note: Pt. Presents in w/c with wife. Pt. Able to transfer per self from w/c to chair. Education and instructions performed to patient and pt.s wife on IV antibiotic therapy, care of PICC, and Ambulatory pump with Naficillin. Pt. Has daily visits to renew pumps cassette of naficillin. Labs drawn. discharge information with telephone numbers to call if they have any questions or concerns with treatment and IV amb. Pump at home..      Intravenous Access:  PICC.    Treatment Conditions:  Not Applicable.      Post Infusion Assessment:  Site patent and intact, free from redness, edema or discomfort.       Discharge Plan:   Discharge instructions reviewed with: Patient and wife.  Patient and/or family verbalized understanding of discharge instructions and all questions answered.      Jenifer Swift RN

## 2022-02-23 ENCOUNTER — INFUSION THERAPY VISIT (OUTPATIENT)
Dept: INFUSION THERAPY | Facility: CLINIC | Age: 67
End: 2022-02-23
Attending: FAMILY MEDICINE
Payer: COMMERCIAL

## 2022-02-23 VITALS
TEMPERATURE: 98.2 F | RESPIRATION RATE: 18 BRPM | OXYGEN SATURATION: 99 % | HEART RATE: 73 BPM | SYSTOLIC BLOOD PRESSURE: 138 MMHG | DIASTOLIC BLOOD PRESSURE: 69 MMHG

## 2022-02-23 DIAGNOSIS — M41.50 SCOLIOSIS DUE TO DEGENERATIVE DISEASE OF SPINE IN ADULT PATIENT: Primary | ICD-10-CM

## 2022-02-23 PROCEDURE — 250N000009 HC RX 250: Performed by: INTERNAL MEDICINE

## 2022-02-23 PROCEDURE — 96374 THER/PROPH/DIAG INJ IV PUSH: CPT

## 2022-02-23 PROCEDURE — 258N000003 HC RX IP 258 OP 636: Performed by: INTERNAL MEDICINE

## 2022-02-23 RX ORDER — HEPARIN SODIUM (PORCINE) LOCK FLUSH IV SOLN 100 UNIT/ML 100 UNIT/ML
5 SOLUTION INTRAVENOUS
Status: CANCELLED | OUTPATIENT
Start: 2022-02-24

## 2022-02-23 RX ORDER — HEPARIN SODIUM,PORCINE 10 UNIT/ML
5 VIAL (ML) INTRAVENOUS
Status: CANCELLED | OUTPATIENT
Start: 2022-02-24

## 2022-02-23 RX ADMIN — NAFCILLIN 12 G: 10 INJECTION, POWDER, FOR SOLUTION INTRAVENOUS at 12:22

## 2022-02-23 NOTE — PROGRESS NOTES
"Pt tolerated infusion pump well. Pt wheeled into/out of clinic for abx pump refill. Pt irritated that pump was beeping \"empty\" before his scheduled appt and that medication takes so long to come from pharmacy; discussed pump with pt, showed him how to manage beeps. Discussed appts with spouse, Fariha, with pt's permission.  "
This document is complete and the patient is ready for discharge.

## 2022-02-25 ENCOUNTER — INFUSION THERAPY VISIT (OUTPATIENT)
Dept: INFUSION THERAPY | Facility: CLINIC | Age: 67
End: 2022-02-25
Attending: FAMILY MEDICINE
Payer: COMMERCIAL

## 2022-02-25 DIAGNOSIS — Z71.89 ACP (ADVANCE CARE PLANNING): Primary | ICD-10-CM

## 2022-02-25 PROCEDURE — G0463 HOSPITAL OUTPT CLINIC VISIT: HCPCS

## 2022-02-28 NOTE — PROGRESS NOTES
Infusion Nursing Note:  Patrick Leal presents today for antibiotic refill.    Patient seen by provider today: No   present during visit today: Not Applicable.    Note: Patient arrived to clinic for antibiotic refill. The bag to his pump was soaking wet. Tubing had a hole in it so unable to determine how much of the drug he actually got over the previous 24 hours. It was then determined that there was no plan for the patient to get his pump refilled over the weekend. After speaking with the provider (Dr. Kwong), it was decided to send the patient to the ER as he needed better care coordination for his antibiotics. [facility] care coordinator was notified and agreeable to this plan. Spoke with patient and his wife. They were also agreeable to going to [facility] to get a better plan in place for receiving his antibiotics.     Intravenous Access:  PICC.    Treatment Conditions:  Not Applicable.      Post Infusion Assessment:  Not Applicable.       Discharge Plan:   Patient and/or family verbalized understanding of discharge instructions and all questions answered.  Patient discharged in stable condition accompanied by: wife.  Departure Mode: Wheelchair.      Jose Harvey RN

## 2022-04-10 ENCOUNTER — HEALTH MAINTENANCE LETTER (OUTPATIENT)
Age: 67
End: 2022-04-10

## 2022-07-24 DIAGNOSIS — E11.42 DIABETIC POLYNEUROPATHY ASSOCIATED WITH TYPE 2 DIABETES MELLITUS (H): ICD-10-CM

## 2022-07-24 DIAGNOSIS — G25.81 RESTLESS LEG SYNDROME: ICD-10-CM

## 2022-07-25 RX ORDER — GABAPENTIN 300 MG/1
300 CAPSULE ORAL 2 TIMES DAILY
Qty: 180 CAPSULE | Refills: 0 | OUTPATIENT
Start: 2022-07-25

## 2022-07-25 NOTE — TELEPHONE ENCOUNTER
Gabapentin refill requested.   Patient no longer a patient here and is seen at the [facility]. f  Please Deny.

## 2022-07-31 ENCOUNTER — HEALTH MAINTENANCE LETTER (OUTPATIENT)
Age: 67
End: 2022-07-31

## 2022-10-16 ENCOUNTER — HEALTH MAINTENANCE LETTER (OUTPATIENT)
Age: 67
End: 2022-10-16

## 2022-11-10 ENCOUNTER — OFFICE VISIT (OUTPATIENT)
Dept: NEUROLOGY | Facility: CLINIC | Age: 67
End: 2022-11-10
Payer: COMMERCIAL

## 2022-11-10 VITALS
WEIGHT: 215 LBS | DIASTOLIC BLOOD PRESSURE: 58 MMHG | BODY MASS INDEX: 33.67 KG/M2 | OXYGEN SATURATION: 98 % | HEART RATE: 81 BPM | SYSTOLIC BLOOD PRESSURE: 85 MMHG | RESPIRATION RATE: 16 BRPM

## 2022-11-10 DIAGNOSIS — E11.42 DIABETIC POLYNEUROPATHY ASSOCIATED WITH TYPE 2 DIABETES MELLITUS (H): ICD-10-CM

## 2022-11-10 DIAGNOSIS — G25.81 RESTLESS LEG SYNDROME: Primary | ICD-10-CM

## 2022-11-10 PROCEDURE — 99215 OFFICE O/P EST HI 40 MIN: CPT | Performed by: PSYCHIATRY & NEUROLOGY

## 2022-11-10 RX ORDER — PRAMIPEXOLE DIHYDROCHLORIDE 0.5 MG/1
TABLET ORAL
Qty: 270 TABLET | Refills: 3 | Status: SHIPPED | OUTPATIENT
Start: 2022-11-10 | End: 2023-06-07

## 2022-11-10 RX ORDER — CARBIDOPA AND LEVODOPA 25; 100 MG/1; MG/1
1 TABLET ORAL 2 TIMES DAILY
Qty: 180 TABLET | Refills: 3 | Status: SHIPPED | OUTPATIENT
Start: 2022-11-10 | End: 2023-06-07

## 2022-11-10 RX ORDER — LEVOTHYROXINE SODIUM 200 UG/1
200 TABLET ORAL DAILY
COMMUNITY
Start: 2022-08-02

## 2022-11-10 RX ORDER — GABAPENTIN 300 MG/1
300 CAPSULE ORAL 2 TIMES DAILY
Qty: 180 CAPSULE | Refills: 3 | Status: SHIPPED | OUTPATIENT
Start: 2022-11-10 | End: 2023-06-07

## 2022-11-10 RX ORDER — AMOXICILLIN 250 MG
1 CAPSULE ORAL DAILY PRN
COMMUNITY
Start: 2022-07-28

## 2022-11-10 RX ORDER — FERROUS SULFATE 325(65) MG
TABLET ORAL
COMMUNITY
Start: 2022-02-25

## 2022-11-10 RX ORDER — GABAPENTIN 600 MG/1
600 TABLET ORAL AT BEDTIME
Qty: 90 TABLET | Refills: 3 | Status: SHIPPED | OUTPATIENT
Start: 2022-11-10 | End: 2023-06-07

## 2022-11-10 RX ORDER — ONDANSETRON 4 MG/1
4 TABLET, FILM COATED ORAL EVERY 6 HOURS PRN
COMMUNITY
Start: 2022-02-28

## 2022-11-10 ASSESSMENT — PAIN SCALES - GENERAL: PAINLEVEL: MODERATE PAIN (4)

## 2022-11-10 NOTE — PATIENT INSTRUCTIONS
Things that exacerbate Restless Leg Syndrome symptoms: infection, illness, injury, major surgeries, POOR SLEEP, exercising too much.    DRUGS WHICH MAY MAKE RESTLESS LEGS WORSE:  - Antipsychotics  - Dopamine blocking antiemetics  - Centrally acting antihistamines, such as diphenhydramine and Allegra  - Serotonergic medications  - Serotonergic antidepressants (except bupropion)   - Caffeine    Plan:   - If Restless Leg Syndrome symptoms worsen, will consider stopping duloxetine and starting pregablin    Patient to return in 7 months at [facility], for in-person visit, 30 minutes.

## 2022-11-10 NOTE — PROGRESS NOTES
"Department of Neurology  Movement Disorders Division     Patient: Patrick Leal   MRN: [id number]   : 1955   Date of Visit: November 10, 2022    History of Present Illness  Mr. Leal is a 67 year old male that presents to Neurology Movement clinic for follow up regarding restless leg syndrome management.  Patient previously followed with Dr. Wilson and was last seen on 2022 where he was seen as a new patient to establish care for restless leg syndrome.     Patient has a history of Hartmann Garrido variant Guillain-Barré syndrome, type 2 diabetes, neuropathy, hypertension and history of lumbar fusion who presents today for hospital follow-up. He was hospitalized at [facility] from 2022-2022 for severe sepsis. Leading up to this, he had underwent instrumentation replacement from L4-L5 on the left and pseudoarthrosis repair on L4-L5. He had been discharged home from that surgical procedure on 2022.    History obtained from patient. He previously followed with Dr. Ovalles for GBS management.   Patient reports since about a month ago, Restless Leg Syndrome symptoms improved.   He had 5th back surgery in 2022 with poor recovery and developed \"major sepsis afterward.\" Since this time, he has lost 25 lbs and he noticed pain and Restless Leg Syndrome symptoms improved. He has been sleeping better lately. He has controlled his appetite, diet and blood glucose. He is working with Physical Therapy on balance issues and tries to walk a mile daily.  Mother with Restless Leg Syndrome symptoms, also. Mother  fell and is status post hip surgery and is recovering well but developed Cdiff; currently in transition care.  After starting gabapentin, he had a hard time getting over sleepiness in AM dose.  He takes duloxetine for chronic depression and has been on this medication \"for a long time.\" His depression is currently controlled.   Movement Disorder-related Medications                   9:30AM PM 1PM PRN " 9-12 PM     Pramipexole 0.5mg                                                1     2     Gabapentin 300mg  1   1            Gabapentin 600mg                      1     Carbidopa/levodopa IR 25/100 1    1     Duloxetine 60mg 2               Review of Systems:  Other than that mentioned above, the remainder of 12 systems reviewed were negative.    PMH: unchanged  PSH: unchanged  FH: unchanged  SH: unchanged    Medications:  Current Outpatient Medications   Medication Sig Dispense Refill     aspirin 81 MG EC tablet Take 81 mg by mouth daily       atorvastatin (LIPITOR) 10 MG tablet Take 1 tablet by mouth daily       blood glucose (OPTIUM TEST STRIPS) test strip Dispense item covered by pt ins. E11.9 NIDDM type II - Test 1 time/day       blood glucose monitoring (ACCU-CHEK FASTCLIX) lancets Use as directed. Test 1 times per day.       calcium carbonate 1250 MG/5ML SUSP suspension        carbidopa-levodopa (SINEMET)  MG tablet Take 1 tablet by mouth 2 times daily 180 tablet 3     Cholecalciferol (VITAMIN D) 2000 UNITS CAPS Take 1 capsule by mouth daily        chromium 200 MCG CAPS Take 200 mcg by mouth daily.       cinnamon 500 MG CAPS Take 1,000 mg by mouth 2 times daily        Contour Next EZ (CONTOUR NEXT EZ W/DEVICE KIT) w/Device KIT USE TO TEST BLOOD SUGARS 1 TIME DAILY.  E11.9 NIDDM TYPE II       diazepam (VALIUM) 5 MG tablet Take 1 tablet by mouth 2 times daily if needed for Muscle Spasm.       diclofenac (VOLTAREN) 1 % GEL Place 4 g onto the skin 4 times daily       DULoxetine (CYMBALTA) 60 MG capsule Take 2 capsules (120 mg) by mouth daily 90 capsule 0     estradiol (VIVELLE-DOT) 0.025 MG/24HR Place 1 patch onto the skin twice a week 8 patch 7     FEROSUL 325 (65 Fe) MG tablet Take 1 Tablet (325 mg) by mouth once daily. Take on an empty stomach or with orange or tomato juice.       Fexofenadine HCl (ALLEGRA PO) Take 180 mg by mouth daily        fluticasone (FLONASE) 50 MCG/ACT nasal spray Spray 1 spray  into both nostrils daily as needed for rhinitis or allergies       fluticasone (FLOVENT DISKUS) 50 MCG/BLIST AEPB Inhale 1 puff into the lungs as needed        gabapentin (NEURONTIN) 300 MG capsule Take 1 capsule (300 mg) by mouth 2 times daily 180 capsule 3     gabapentin (NEURONTIN) 600 MG tablet Take 1 tablet (600 mg) by mouth At Bedtime 90 tablet 3     levothyroxine (SYNTHROID/LEVOTHROID) 200 MCG tablet Take 200 mcg by mouth       lidocaine (LIDODERM) 5 % patch Place 2 patches onto the skin as needed        LORAZEPAM PO Take 1 mg by mouth as needed        magnesium 250 MG tablet Take 1 tablet by mouth daily       metFORMIN (GLUCOPHAGE) 1000 MG tablet Take 1,000 mg by mouth 2 times daily Restart tomorrow (Fri) 60 tablet      ondansetron (ZOFRAN) 4 MG tablet Take 4 mg by mouth       pantoprazole (PROTONIX) 40 MG EC tablet Take 40 mg by mouth daily       pramipexole (MIRAPEX) 0.5 MG tablet Take 1 tab po 1AM and 2 tabs po at bedtime (at pm) 270 tablet 3     semaglutide (OZEMPIC) 2 MG/1.5ML SOPN pen Inject 0.5 mg Subcutaneous       senna-docusate (SENOKOT-S/PERICOLACE) 8.6-50 MG tablet Take 1 tablet by mouth       traZODone (DESYREL) 100 MG tablet Take 2 tablets by mouth nightly as needed        vitamin B complex with vitamin C (STRESS TAB) tablet Take 1 tablet by mouth daily       vitamin E 400 UNITS TABS Take 400 Units by mouth daily       glipiZIDE (GLUCOTROL) 10 MG tablet Take 1 tablet by mouth daily (Patient not taking: Reported on 11/10/2022)       levothyroxine (SYNTHROID, LEVOTHROID) 150 MCG tablet Take 150 mcg by mouth daily  (Patient not taking: Reported on 11/10/2022)       losartan (COZAAR) 25 MG tablet Take 25 mg by mouth daily (Patient not taking: Reported on 11/10/2022)             Allergies   Allergen Reactions     Ambien      SLEEP WALKING     Amoxicillin      Stomach upset     Bacitracin      Other reaction(s): *Unknown     Erythromycin      Other reaction(s): GI Upset     Morphine      Other  reaction(s): flushed feeling, Flushing  Facial flushing       Zolpidem Other (See Comments)     Other reaction(s): Agitation, Confusion, Hallucinations  SLEEP WALKING  Sleep Walking            Physical Exam:  The patient's  weight is 97.5 kg (215 lb). His blood pressure is 85/58 (abnormal) and his pulse is 81. His respiration is 16 and oxygen saturation is 98%.    Physical Exam:  GENERAL: alert, active, attentive, appropriately groomed   HEENT: normocephalic, eyes open with no discharge, nares patent, oropharynx clear-no lesions  CHEST: non labored breathing  PSYCH: mood stable    Neurologic Exam:  MENTAL STATUS: Alert and oriented to person, place, time, and situation. Follows commands. Recent and remote memory intact. Attention span and concentration intact. Fund of knowledge intact to current events. Able to recall 3/3 objects. Able to assess $2.75 in quarters. Able to spell WORLD backwards. Able to recall current president and past presidents until Trump.  Able to name a body part (thumb) and object and describe its function.   SPEECH: Fluent, intact comprehension and articulation  CN: visual fields intact in all fields, EOMIB, no nystagmus, normal saccades, facial sensation intact, facial movement symmetric, hearing grossly intact to conversation, tongue protrudes midline   MOTOR: Moves all extremities equally against gravity without difficulty with 5/5 strength in BUE/BLE involving ShAbd, ElbFlex, ElbEx, 4/5 HipFlex, KneeExt, KneeFlex, ADF  Involuntary movements:   Tone: Normal axial and appendicular tone  Lateral tilt of torso to the right while sitting and standing and walking  REFLEXES (R/L): 1/1 in biceps, brachioradialis, triceps, patellars, achilles; no clonus, toes down going  SENSATION: intact to light touch throughout   COORDINATION: no dysmetria with FTN, HTS  GAIT: able to rise unassisted from a seated position, normal arm swing and normal stride length, no en bloc turns, no ataxia      Data  Reviewed: I have personally reviewed the tests/studies below.   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 He is treated with a combination of gabapentin, pramipexole, and Sinemet. The patient's Restless Leg Syndrome symptoms are currently controlled and no changes have been made to Restless Leg Syndrome medication regimen. We discussed factors that exacerbate Restless Leg Syndrome symptoms.     Restless Leg Syndrome: Developed in 2002 treatment was initiated after he was diagnosed with Guillain-Barré syndrome. He was initially started on pramipexole.  Carbidopa/levodopa was added about 1-1.5 years ago when he had worsening of his restless legs again. He was initially on gabapentin for his back pain, but the bedtime dose was increase for RLS.   Guillain-[place]: Diagnosed in 2015 requiring a 10 day hospitalization followed by rehab for 2 weeks. He presented with facial weakness, and lower extremity weakness. Prior to the GBS he was walking at least 5 miles per day, but since then he has not been able to walk more than a mile  DM2    Plan:   - No changes to current Restless Leg Syndrome medication regimen  - Continue to exercise daily and safely   - Try to decrease or stop caffeine intake  - Consider [employer] leg massager ([employer])  - Continue management of diabetes mellitus    DRUGS WHICH MAY MAKE RESTLESS LEGS WORSE:  - Antipsychotics  - Dopamine blocking antiemetics  - Centrally acting antihistamines, such as diphenhydramine  - Serotonergic medications  - Serotonergic antidepressants (except bupropion)   - Caffeine    Patient to  return in 7 months at [facility], for in-person visit, 30 minutes.     Miracle Mills DO, MA   of Neurology   [facility]    50 minutes spent on date of encounter doing chart reviews and exam and documentation and further activities as noted above.     Franklin Gar, MS4, present for this appointment for educational purposes.

## 2022-11-10 NOTE — NURSING NOTE
Chief Complaint   Patient presents with     Consult     NEW MOVEMENT DISORDER - 6 mo. F/u     Fam Pickard

## 2022-11-10 NOTE — LETTER
"11/10/2022       RE: Patrick Leal  [address]  [facility] [address]     Dear Colleague,    Thank you for referring your patient, Patrick Leal, to the [facility] [place] at [facility]. Please see a copy of my visit note below.    Department of Neurology  Movement Disorders Division     Patient: Patrick Leal   MRN: [id number]   : 1955   Date of Visit: November 10, 2022    History of Present Illness  Mr. Leal is a 67 year old male that presents to Neurology Movement clinic for follow up regarding restless leg syndrome management.  Patient previously followed with Dr. Wilson and was last seen on 2022 where he was seen as a new patient to establish care for restless leg syndrome.     Patient has a history of Hartmann Garrido variant Guillain-Barré syndrome, type 2 diabetes, neuropathy, hypertension and history of lumbar fusion who presents today for hospital follow-up. He was hospitalized at [place] from 2022-2022 for severe sepsis. Leading up to this, he had underwent instrumentation replacement from L4-L5 on the left and pseudoarthrosis repair on L4-L5. He had been discharged home from that surgical procedure on 2022.    History obtained from patient. He previously followed with Dr. Ovalles for GBS management.   Patient reports since about a month ago, Restless Leg Syndrome symptoms improved.   He had 5th back surgery in 2022 with poor recovery and developed \"major sepsis afterward.\" Since this time, he has lost 25 lbs and he noticed pain and Restless Leg Syndrome symptoms improved. He has been sleeping better lately. He has controlled his appetite, diet and blood glucose. He is working with Physical Therapy on balance issues and tries to walk a mile daily.  Mother with Restless Leg Syndrome symptoms, also. Mother  fell and is status post hip surgery and is recovering well but developed " "Cdiff; currently in transition care.  After starting gabapentin, he had a hard time getting over sleepiness in AM dose.  He takes duloxetine for chronic depression and has been on this medication \"for a long time.\" His depression is currently controlled.   Movement Disorder-related Medications                   9:30AM PM 1PM PRN 9-12 PM     Pramipexole 0.5mg                                                1     2     Gabapentin 300mg  1   1            Gabapentin 600mg                      1     Carbidopa/levodopa IR 25/100 1    1     Duloxetine 60mg 2               Review of Systems:  Other than that mentioned above, the remainder of 12 systems reviewed were negative.    PMH: unchanged  PSH: unchanged  FH: unchanged  SH: unchanged    Medications:  Current Outpatient Medications   Medication Sig Dispense Refill     aspirin 81 MG EC tablet Take 81 mg by mouth daily       atorvastatin (LIPITOR) 10 MG tablet Take 1 tablet by mouth daily       blood glucose (OPTIUM TEST STRIPS) test strip Dispense item covered by pt ins. E11.9 NIDDM type II - Test 1 time/day       blood glucose monitoring (ACCU-CHEK FASTCLIX) lancets Use as directed. Test 1 times per day.       calcium carbonate 1250 MG/5ML SUSP suspension        carbidopa-levodopa (SINEMET)  MG tablet Take 1 tablet by mouth 2 times daily 180 tablet 3     Cholecalciferol (VITAMIN D) 2000 UNITS CAPS Take 1 capsule by mouth daily        chromium 200 MCG CAPS Take 200 mcg by mouth daily.       cinnamon 500 MG CAPS Take 1,000 mg by mouth 2 times daily        Contour Next EZ (CONTOUR NEXT EZ W/DEVICE KIT) w/Device KIT USE TO TEST BLOOD SUGARS 1 TIME DAILY.  E11.9 NIDDM TYPE II       diazepam (VALIUM) 5 MG tablet Take 1 tablet by mouth 2 times daily if needed for Muscle Spasm.       diclofenac (VOLTAREN) 1 % GEL Place 4 g onto the skin 4 times daily       DULoxetine (CYMBALTA) 60 MG capsule Take 2 capsules (120 mg) by mouth daily 90 capsule 0     estradiol (VIVELLE-DOT) " 0.025 MG/24HR Place 1 patch onto the skin twice a week 8 patch 7     FEROSUL 325 (65 Fe) MG tablet Take 1 Tablet (325 mg) by mouth once daily. Take on an empty stomach or with orange or tomato juice.       Fexofenadine HCl (ALLEGRA PO) Take 180 mg by mouth daily        fluticasone (FLONASE) 50 MCG/ACT nasal spray Spray 1 spray into both nostrils daily as needed for rhinitis or allergies       fluticasone (FLOVENT DISKUS) 50 MCG/BLIST AEPB Inhale 1 puff into the lungs as needed        gabapentin (NEURONTIN) 300 MG capsule Take 1 capsule (300 mg) by mouth 2 times daily 180 capsule 3     gabapentin (NEURONTIN) 600 MG tablet Take 1 tablet (600 mg) by mouth At Bedtime 90 tablet 3     levothyroxine (SYNTHROID/LEVOTHROID) 200 MCG tablet Take 200 mcg by mouth       lidocaine (LIDODERM) 5 % patch Place 2 patches onto the skin as needed        LORAZEPAM PO Take 1 mg by mouth as needed        magnesium 250 MG tablet Take 1 tablet by mouth daily       metFORMIN (GLUCOPHAGE) 1000 MG tablet Take 1,000 mg by mouth 2 times daily Restart tomorrow (Fri) 60 tablet      ondansetron (ZOFRAN) 4 MG tablet Take 4 mg by mouth       pantoprazole (PROTONIX) 40 MG EC tablet Take 40 mg by mouth daily       pramipexole (MIRAPEX) 0.5 MG tablet Take 1 tab po 1AM and 2 tabs po at bedtime (at pm) 270 tablet 3     semaglutide (OZEMPIC) 2 MG/1.5ML SOPN pen Inject 0.5 mg Subcutaneous       senna-docusate (SENOKOT-S/PERICOLACE) 8.6-50 MG tablet Take 1 tablet by mouth       traZODone (DESYREL) 100 MG tablet Take 2 tablets by mouth nightly as needed        vitamin B complex with vitamin C (STRESS TAB) tablet Take 1 tablet by mouth daily       vitamin E 400 UNITS TABS Take 400 Units by mouth daily       glipiZIDE (GLUCOTROL) 10 MG tablet Take 1 tablet by mouth daily (Patient not taking: Reported on 11/10/2022)       levothyroxine (SYNTHROID, LEVOTHROID) 150 MCG tablet Take 150 mcg by mouth daily  (Patient not taking: Reported on 11/10/2022)       losartan  (COZAAR) 25 MG tablet Take 25 mg by mouth daily (Patient not taking: Reported on 11/10/2022)             Allergies   Allergen Reactions     Ambien      SLEEP WALKING     Amoxicillin      Stomach upset     Bacitracin      Other reaction(s): *Unknown     Erythromycin      Other reaction(s): GI Upset     Morphine      Other reaction(s): flushed feeling, Flushing  Facial flushing       Zolpidem Other (See Comments)     Other reaction(s): Agitation, Confusion, Hallucinations  SLEEP WALKING  Sleep Walking            Physical Exam:  The patient's  weight is 97.5 kg (215 lb). His blood pressure is 85/58 (abnormal) and his pulse is 81. His respiration is 16 and oxygen saturation is 98%.    Physical Exam:  GENERAL: alert, active, attentive, appropriately groomed   HEENT: normocephalic, eyes open with no discharge, nares patent, oropharynx clear-no lesions  CHEST: non labored breathing  PSYCH: mood stable    Neurologic Exam:  MENTAL STATUS: Alert and oriented to person, place, time, and situation. Follows commands. Recent and remote memory intact. Attention span and concentration intact. Fund of knowledge intact to current events. Able to recall 3/3 objects. Able to assess $2.75 in quarters. Able to spell WORLD backwards. Able to recall current president and past presidents until Trump.  Able to name a body part (thumb) and object and describe its function.   SPEECH: Fluent, intact comprehension and articulation  CN: visual fields intact in all fields, EOMIB, no nystagmus, normal saccades, facial sensation intact, facial movement symmetric, hearing grossly intact to conversation, tongue protrudes midline   MOTOR: Moves all extremities equally against gravity without difficulty with 5/5 strength in BUE/BLE involving ShAbd, ElbFlex, ElbEx, 4/5 HipFlex, KneeExt, KneeFlex, ADF  Involuntary movements:   Tone: Normal axial and appendicular tone  Lateral tilt of torso to the right while sitting and standing and walking  REFLEXES  (R/L): 1/1 in biceps, brachioradialis, triceps, patellars, achilles; no clonus, toes down going  SENSATION: intact to light touch throughout   COORDINATION: no dysmetria with FTN, HTS  GAIT: able to rise unassisted from a seated position, normal arm swing and normal stride length, no en bloc turns, no ataxia      Data Reviewed: I have personally reviewed the tests/studies below.   - 3/2022 BMP with elevated glucose, decreased creatinine/BUN, CK total minimally low, CBC abnormal  - 2/2022 vitamin B12 at 234, ferritin within normal limits, iron level and iron saturation low, TSH within normal limits  - A1c of 8.7% on 12/01/2021    Impression:  Patrick Leal is a 67 year old male with a history of Hartmann-Garrido variant Guillain-[place] syndrome, diabetes with peripheral neuropathy, and restless legs syndrome. He is treated with a combination of gabapentin, pramipexole, and Sinemet. The patient's Restless Leg Syndrome symptoms are currently controlled and no changes have been made to Restless Leg Syndrome medication regimen. We discussed factors that exacerbate Restless Leg Syndrome symptoms.     Restless Leg Syndrome: Developed in 2002 treatment was initiated after he was diagnosed with Guillain-Barré syndrome. He was initially started on pramipexole.  Carbidopa/levodopa was added about 1-1.5 years ago when he had worsening of his restless legs again. He was initially on gabapentin for his back pain, but the bedtime dose was increase for RLS.   Guillain-[place]: Diagnosed in 2015 requiring a 10 day hospitalization followed by rehab for 2 weeks. He presented with facial weakness, and lower extremity weakness. Prior to the GBS he was walking at least 5 miles per day, but since then he has not been able to walk more than a mile  DM2    Plan:   - No changes to current Restless Leg Syndrome medication regimen  - Continue to exercise daily and safely   - Try to decrease or stop caffeine intake  - Consider [employer] lolis  massager ([employer])  - Continue management of diabetes mellitus    DRUGS WHICH MAY MAKE RESTLESS LEGS WORSE:  - Antipsychotics  - Dopamine blocking antiemetics  - Centrally acting antihistamines, such as diphenhydramine  - Serotonergic medications  - Serotonergic antidepressants (except bupropion)   - Caffeine    Patient to return in 7 months at [facility], for in-person visit, 30 minutes.     Miracle Mills DO, MA   of Neurology   [facility]    50 minutes spent on date of encounter doing chart reviews and exam and documentation and further activities as noted above.     Franklin Gar, MS4, present for this appointment for educational purposes.

## 2022-12-03 ENCOUNTER — HEALTH MAINTENANCE LETTER (OUTPATIENT)
Age: 67
End: 2022-12-03

## 2023-06-01 ENCOUNTER — HEALTH MAINTENANCE LETTER (OUTPATIENT)
Age: 68
End: 2023-06-01

## 2023-06-07 ENCOUNTER — TELEPHONE (OUTPATIENT)
Dept: NEUROLOGY | Facility: CLINIC | Age: 68
End: 2023-06-07

## 2023-06-07 ENCOUNTER — OFFICE VISIT (OUTPATIENT)
Dept: NEUROLOGY | Facility: CLINIC | Age: 68
End: 2023-06-07
Payer: COMMERCIAL

## 2023-06-07 VITALS — HEART RATE: 78 BPM | SYSTOLIC BLOOD PRESSURE: 99 MMHG | DIASTOLIC BLOOD PRESSURE: 59 MMHG

## 2023-06-07 DIAGNOSIS — E11.42 DIABETIC POLYNEUROPATHY ASSOCIATED WITH TYPE 2 DIABETES MELLITUS (H): ICD-10-CM

## 2023-06-07 DIAGNOSIS — G25.81 RESTLESS LEG SYNDROME: Primary | ICD-10-CM

## 2023-06-07 PROCEDURE — 99214 OFFICE O/P EST MOD 30 MIN: CPT | Performed by: PSYCHIATRY & NEUROLOGY

## 2023-06-07 RX ORDER — GABAPENTIN 300 MG/1
300 CAPSULE ORAL 2 TIMES DAILY
Qty: 180 CAPSULE | Refills: 3 | Status: SHIPPED | OUTPATIENT
Start: 2023-11-01 | End: 2023-12-15

## 2023-06-07 RX ORDER — GABAPENTIN 600 MG/1
600 TABLET ORAL AT BEDTIME
Qty: 90 TABLET | Refills: 3 | Status: SHIPPED | OUTPATIENT
Start: 2023-11-01 | End: 2023-12-15

## 2023-06-07 RX ORDER — ROPINIROLE 1 MG/1
1-1.5 TABLET, FILM COATED ORAL AT BEDTIME
COMMUNITY
Start: 2023-04-20 | End: 2024-08-08

## 2023-06-07 RX ORDER — CARBIDOPA AND LEVODOPA 25; 100 MG/1; MG/1
1 TABLET ORAL 2 TIMES DAILY
Qty: 180 TABLET | Refills: 3 | Status: SHIPPED | OUTPATIENT
Start: 2023-11-01 | End: 2023-12-15

## 2023-06-07 NOTE — PATIENT INSTRUCTIONS
Plan:   - No changes to current Restless Leg Syndrome medication regimen  - Continue to exercise daily and safely   - Try to decrease or stop caffeine intake    DRUGS WHICH MAY MAKE RESTLESS LEGS WORSE:  - Antipsychotics  - Dopamine blocking antiemetics  - Centrally acting antihistamines, such as diphenhydramine  - Serotonergic medications  - Serotonergic antidepressants (except bupropion)   - Caffeine    Patient to return in 12 months, for in-person visit, 30 minutes.

## 2023-06-07 NOTE — PROGRESS NOTES
Department of Neurology  Movement Disorders Division     Patient: Patrick Leal   MRN: [id number]   : 1955   Date of Visit:  2023    History of Present Illness  Mr. Leal is a pleasant 67 year old male that presents to Neurology Movement clinic for follow up regarding Restless Leg Syndrome management.   Patient was last seen on 2022 as a new patient for Restless Leg Syndrome and his symptoms were improved at this time and no changes were made to Restless Leg Syndrome medication regimen.     History obtained from patient.   Patient reports he is having lower lumbar pain and Dr. Sales, Orthopedic Specialist, is aware. He is not wanting another surgery as he status post 6 back surgery, 11 surgeries total.   Restless Leg Syndrome: He reports good symptom control with ropinirole. Stopped pramipexole.    Sleep: No issues  He is getting numbness and tingling in fingers. He is an amateur wood worker.    He previously followed with Dr. Ovalles for GBS management.    Talks with a therapist every 2 weeks to monthly.     Movement Disorder-related Medications                   9:30AM PM 1PM PRN 9-12 PM 10-11 pm    Pramipexole 0.5mg - not taking                                                        Ropinirole 1 mg      3   Gabapentin 300mg  1   1            Gabapentin 600mg                      1     Carbidopa/levodopa IR 25/100 1     1     Duloxetine 60mg 2             Review of Systems:  Other than that mentioned above, the remainder of 12 systems reviewed were negative.    PMH: unchanged  PSH: unchanged  FH: unchanged  SH: unchanged    Medications:  Current Outpatient Medications   Medication Sig Dispense Refill     aspirin 81 MG EC tablet Take 81 mg by mouth daily       atorvastatin (LIPITOR) 10 MG tablet Take 1 tablet by mouth daily       blood glucose (OPTIUM TEST STRIPS) test strip Dispense item covered by pt ins. E11.9 NIDDM type II - Test 1 time/day       blood glucose monitoring (ACCU-CHEK  FASTCLIX) lancets Use as directed. Test 1 times per day.       calcium carbonate 1250 MG/5ML SUSP suspension        carbidopa-levodopa (SINEMET)  MG tablet Take 1 tablet by mouth 2 times daily 180 tablet 3     Cholecalciferol (VITAMIN D) 2000 UNITS CAPS Take 1 capsule by mouth daily        chromium 200 MCG CAPS Take 200 mcg by mouth daily.       cinnamon 500 MG CAPS Take 1,000 mg by mouth 2 times daily        Contour Next EZ (CONTOUR NEXT EZ W/DEVICE KIT) w/Device KIT USE TO TEST BLOOD SUGARS 1 TIME DAILY.  E11.9 NIDDM TYPE II       diazepam (VALIUM) 5 MG tablet Take 1 tablet by mouth 2 times daily if needed for Muscle Spasm.       diclofenac (VOLTAREN) 1 % GEL Place 4 g onto the skin 4 times daily       DULoxetine (CYMBALTA) 60 MG capsule Take 2 capsules (120 mg) by mouth daily 90 capsule 0     estradiol (VIVELLE-DOT) 0.025 MG/24HR Place 1 patch onto the skin twice a week 8 patch 7     FEROSUL 325 (65 Fe) MG tablet Take 1 Tablet (325 mg) by mouth once daily. Take on an empty stomach or with orange or tomato juice.       Fexofenadine HCl (ALLEGRA PO) Take 180 mg by mouth daily        fluticasone (FLONASE) 50 MCG/ACT nasal spray Spray 1 spray into both nostrils daily as needed for rhinitis or allergies       fluticasone (FLOVENT DISKUS) 50 MCG/BLIST AEPB Inhale 1 puff into the lungs as needed        gabapentin (NEURONTIN) 300 MG capsule Take 1 capsule (300 mg) by mouth 2 times daily 180 capsule 3     gabapentin (NEURONTIN) 600 MG tablet Take 1 tablet (600 mg) by mouth At Bedtime 90 tablet 3     levothyroxine (SYNTHROID/LEVOTHROID) 200 MCG tablet Take 200 mcg by mouth       lidocaine (LIDODERM) 5 % patch Place 2 patches onto the skin as needed        LORAZEPAM PO Take 1 mg by mouth as needed        magnesium 250 MG tablet Take 1 tablet by mouth daily       metFORMIN (GLUCOPHAGE) 1000 MG tablet Take 1,000 mg by mouth 2 times daily Restart tomorrow (Fri) 60 tablet      ondansetron (ZOFRAN) 4 MG tablet Take 4 mg by  mouth       pantoprazole (PROTONIX) 40 MG EC tablet Take 40 mg by mouth daily       pramipexole (MIRAPEX) 0.5 MG tablet Take 1 tab po 1AM and 2 tabs po at bedtime (at pm) 270 tablet 3     rOPINIRole (REQUIP) 1 MG tablet Take 1 tablet (1 mg) by mouth once daily. May increase by 1 mg every 2 days for a max of 4 mg daily For restless leg       semaglutide (OZEMPIC) 2 MG/1.5ML SOPN pen Inject 0.5 mg Subcutaneous every 7 days       senna-docusate (SENOKOT-S/PERICOLACE) 8.6-50 MG tablet Take 1 tablet by mouth       traZODone (DESYREL) 100 MG tablet Take 2 tablets by mouth nightly as needed        vitamin B complex with vitamin C (STRESS TAB) tablet Take 1 tablet by mouth daily       vitamin E 400 UNITS TABS Take 400 Units by mouth daily             Allergies   Allergen Reactions     Zolpidem Tartrate      SLEEP WALKING     Amoxicillin      Stomach upset     Bacitracin      Other reaction(s): *Unknown     Erythromycin      Other reaction(s): GI Upset     Morphine      Other reaction(s): flushed feeling, Flushing  Facial flushing       Zolpidem Other (See Comments)     Other reaction(s): Agitation, Confusion, Hallucinations  SLEEP WALKING  Sleep Walking            Physical Exam:  The patient's  blood pressure is 99/59 and his pulse is 78.    Physical Exam:  GENERAL: alert, active, attentive, appropriately groomed   HEENT: normocephalic, eyes open with no discharge, nares patent, oropharynx clear-no lesions  CHEST: non labored breathing  PSYCH: mood stable     Neurologic Exam:  MENTAL STATUS: Alert and oriented to person, place, time, and situation. Follows commands. Recent and remote memory intact. Attention span and concentration intact. Fund of knowledge intact to current events.   SPEECH: Fluent, intact comprehension and articulation  CN: visual fields intact, facial movement symmetric, hearing grossly intact to conversation   MOTOR: Moves all extremities equally against gravity without difficulty, edentulous    Involuntary movements: none  COORDINATION: no dysmetria with FTN  GAIT: able to rise unassisted from a seated position, normal arm swing and normal stride length, no en bloc turns, no ataxia    Impression:  Patrick Leal is a 67 year old male with Hartmann-Garrido variant Guillain-[place] syndrome, diabetes with peripheral neuropathy, and restless legs syndrome. He is treated with a combination of gabapentin, ropinirole, and Sinemet. The patient's main concern today is low back pain.    Restless Leg Syndrome: Developed in 2002 treatment was initiated after he was diagnosed with Guillain-Barré syndrome. He was initially started on pramipexole.  Carbidopa/levodopa was added about 1-1.5 years ago when he had worsening of his restless legs again. He was initially on gabapentin for his back pain, but the bedtime dose was increase for RLS. He stopped taking pramipexole (unclear reason) and was started on ropinirole with good Restless Leg Syndrome treatment from another provider.  Guillain-[place]: Diagnosed in 2015 requiring a 10 day hospitalization followed by rehab for 2 weeks. He presented with facial weakness, and lower extremity weakness. Prior to the GBS he was walking at least 5 miles per day, but since then he has not been able to walk more than a mile  DM2    Plan:   - No changes to current Restless Leg Syndrome medication regimen  - Continue to exercise daily and safely   - Try to decrease or stop caffeine intake    DRUGS WHICH MAY MAKE RESTLESS LEGS WORSE:  - Antipsychotics  - Dopamine blocking antiemetics  - Centrally acting antihistamines, such as diphenhydramine  - Serotonergic medications  - Serotonergic antidepressants (except bupropion)   - Caffeine    Patient to return in 12 months, for in-person visit, 30 minutes.     Miracle Mills DO MA   of Neurology   [facility]    30 minutes spent on date of encounter doing chart reviews and exam and documentation and further  activities as noted above.

## 2023-06-07 NOTE — TELEPHONE ENCOUNTER
Per Dr. Mills's message:  I renew the Rx based on the patient's last refill. So he should be out of gabpentin in 11/2023 which is when I renewed this medication. But it is entirely possible that I did the math wrong in my head so if I need to correct it, I will.     Thank you, Daniel!     Miracle       Called pt's pharmacy and spoke to the pharmacist Alix and relayed Dr. Mills's message. Per Alix did check and the pt does have enough refills up to November.     No other questions.    BEHZAD Young on 6/7/2023 at 1:42 PM

## 2023-06-07 NOTE — LETTER
2023         RE: Patrick Leal  [address]  [facility] [address]        Dear Colleague,    Thank you for referring your patient, Patrick Leal, to the [facility] NEUROLOGY [facility]IC [facility]. Please see a copy of my visit note below.    Department of Neurology  Movement Disorders Division     Patient: Patrick Leal   MRN: [id number]   : 1955   Date of Visit:  2023    History of Present Illness  Mr. Leal is a pleasant 67 year old male that presents to Neurology Movement clinic for follow up regarding Restless Leg Syndrome management.   Patient was last seen on 2022 as a new patient for Restless Leg Syndrome and his symptoms were improved at this time and no changes were made to Restless Leg Syndrome medication regimen.     History obtained from patient.   Patient reports he is having lower lumbar pain and Dr. Sales, Orthopedic Specialist, is aware. He is not wanting another surgery as he status post 6 back surgery, 11 surgeries total.   Restless Leg Syndrome: He reports good symptom control with ropinirole. Stopped pramipexole.    Sleep: No issues  He is getting numbness and tingling in fingers. He is an amateur wood worker.    He previously followed with Dr. Ovalles for GBS management.    Talks with a therapist every 2 weeks to monthly.     Movement Disorder-related Medications                   9:30AM PM 1PM PRN 9-12 PM 10-11 pm    Pramipexole 0.5mg - not taking                                                        Ropinirole 1 mg      3   Gabapentin 300mg  1   1            Gabapentin 600mg                      1     Carbidopa/levodopa IR 25/100 1     1     Duloxetine 60mg 2             Review of Systems:  Other than that mentioned above, the remainder of 12 systems reviewed were negative.    PMH: unchanged  PSH: unchanged  FH: unchanged  SH: unchanged    Medications:  Current Outpatient Medications   Medication Sig Dispense Refill      aspirin 81 MG EC tablet Take 81 mg by mouth daily       atorvastatin (LIPITOR) 10 MG tablet Take 1 tablet by mouth daily       blood glucose (OPTIUM TEST STRIPS) test strip Dispense item covered by pt ins. E11.9 NIDDM type II - Test 1 time/day       blood glucose monitoring (ACCU-CHEK FASTCLIX) lancets Use as directed. Test 1 times per day.       calcium carbonate 1250 MG/5ML SUSP suspension        carbidopa-levodopa (SINEMET)  MG tablet Take 1 tablet by mouth 2 times daily 180 tablet 3     Cholecalciferol (VITAMIN D) 2000 UNITS CAPS Take 1 capsule by mouth daily        chromium 200 MCG CAPS Take 200 mcg by mouth daily.       cinnamon 500 MG CAPS Take 1,000 mg by mouth 2 times daily        Contour Next EZ (CONTOUR NEXT EZ W/DEVICE KIT) w/Device KIT USE TO TEST BLOOD SUGARS 1 TIME DAILY.  E11.9 NIDDM TYPE II       diazepam (VALIUM) 5 MG tablet Take 1 tablet by mouth 2 times daily if needed for Muscle Spasm.       diclofenac (VOLTAREN) 1 % GEL Place 4 g onto the skin 4 times daily       DULoxetine (CYMBALTA) 60 MG capsule Take 2 capsules (120 mg) by mouth daily 90 capsule 0     estradiol (VIVELLE-DOT) 0.025 MG/24HR Place 1 patch onto the skin twice a week 8 patch 7     FEROSUL 325 (65 Fe) MG tablet Take 1 Tablet (325 mg) by mouth once daily. Take on an empty stomach or with orange or tomato juice.       Fexofenadine HCl (ALLEGRA PO) Take 180 mg by mouth daily        fluticasone (FLONASE) 50 MCG/ACT nasal spray Spray 1 spray into both nostrils daily as needed for rhinitis or allergies       fluticasone (FLOVENT DISKUS) 50 MCG/BLIST AEPB Inhale 1 puff into the lungs as needed        gabapentin (NEURONTIN) 300 MG capsule Take 1 capsule (300 mg) by mouth 2 times daily 180 capsule 3     gabapentin (NEURONTIN) 600 MG tablet Take 1 tablet (600 mg) by mouth At Bedtime 90 tablet 3     levothyroxine (SYNTHROID/LEVOTHROID) 200 MCG tablet Take 200 mcg by mouth       lidocaine (LIDODERM) 5 % patch Place 2 patches onto the  skin as needed        LORAZEPAM PO Take 1 mg by mouth as needed        magnesium 250 MG tablet Take 1 tablet by mouth daily       metFORMIN (GLUCOPHAGE) 1000 MG tablet Take 1,000 mg by mouth 2 times daily Restart tomorrow (Fri) 60 tablet      ondansetron (ZOFRAN) 4 MG tablet Take 4 mg by mouth       pantoprazole (PROTONIX) 40 MG EC tablet Take 40 mg by mouth daily       pramipexole (MIRAPEX) 0.5 MG tablet Take 1 tab po 1AM and 2 tabs po at bedtime (at pm) 270 tablet 3     rOPINIRole (REQUIP) 1 MG tablet Take 1 tablet (1 mg) by mouth once daily. May increase by 1 mg every 2 days for a max of 4 mg daily For restless leg       semaglutide (OZEMPIC) 2 MG/1.5ML SOPN pen Inject 0.5 mg Subcutaneous every 7 days       senna-docusate (SENOKOT-S/PERICOLACE) 8.6-50 MG tablet Take 1 tablet by mouth       traZODone (DESYREL) 100 MG tablet Take 2 tablets by mouth nightly as needed        vitamin B complex with vitamin C (STRESS TAB) tablet Take 1 tablet by mouth daily       vitamin E 400 UNITS TABS Take 400 Units by mouth daily             Allergies   Allergen Reactions     Zolpidem Tartrate      SLEEP WALKING     Amoxicillin      Stomach upset     Bacitracin      Other reaction(s): *Unknown     Erythromycin      Other reaction(s): GI Upset     Morphine      Other reaction(s): flushed feeling, Flushing  Facial flushing       Zolpidem Other (See Comments)     Other reaction(s): Agitation, Confusion, Hallucinations  SLEEP WALKING  Sleep Walking            Physical Exam:  The patient's  blood pressure is 99/59 and his pulse is 78.    Physical Exam:  GENERAL: alert, active, attentive, appropriately groomed   HEENT: normocephalic, eyes open with no discharge, nares patent, oropharynx clear-no lesions  CHEST: non labored breathing  PSYCH: mood stable     Neurologic Exam:  MENTAL STATUS: Alert and oriented to person, place, time, and situation. Follows commands. Recent and remote memory intact. Attention span and concentration intact.  Fund of knowledge intact to current events.   SPEECH: Fluent, intact comprehension and articulation  CN: visual fields intact, facial movement symmetric, hearing grossly intact to conversation   MOTOR: Moves all extremities equally against gravity without difficulty, edentulous   Involuntary movements: none  COORDINATION: no dysmetria with FTN  GAIT: able to rise unassisted from a seated position, normal arm swing and normal stride length, no en bloc turns, no ataxia    Impression:  Patrick Leal is a 67 year old male with Hartmann-Garrido variant Guillain-Cando syndrome, diabetes with peripheral neuropathy, and restless legs syndrome. He is treated with a combination of gabapentin, ropinirole, and Sinemet. The patient's main concern today is low back pain.    Restless Leg Syndrome: Developed in 2002 treatment was initiated after he was diagnosed with Guillain-Barré syndrome. He was initially started on pramipexole.  Carbidopa/levodopa was added about 1-1.5 years ago when he had worsening of his restless legs again. He was initially on gabapentin for his back pain, but the bedtime dose was increase for RLS. He stopped taking pramipexole (unclear reason) and was started on ropinirole with good Restless Leg Syndrome treatment from another provider.  Guillain-Cando: Diagnosed in 2015 requiring a 10 day hospitalization followed by rehab for 2 weeks. He presented with facial weakness, and lower extremity weakness. Prior to the GBS he was walking at least 5 miles per day, but since then he has not been able to walk more than a mile  DM2    Plan:   - No changes to current Restless Leg Syndrome medication regimen  - Continue to exercise daily and safely   - Try to decrease or stop caffeine intake    DRUGS WHICH MAY MAKE RESTLESS LEGS WORSE:  - Antipsychotics  - Dopamine blocking antiemetics  - Centrally acting antihistamines, such as diphenhydramine  - Serotonergic medications  - Serotonergic antidepressants (except  bupropion)   - Caffeine    Patient to return in 12 months, for in-person visit, 30 minutes.     Miracle Mills DO, MA   of Neurology   [facility]    30 minutes spent on date of encounter doing chart reviews and exam and documentation and further activities as noted above.                      Again, thank you for allowing me to participate in the care of your patient.        Sincerely,        Miracle Mills DO

## 2023-06-07 NOTE — TELEPHONE ENCOUNTER
[facility] Call Center    Phone Message    May a detailed message be left on voicemail: yes     Reason for Call: Medication Question or concern regarding medication   Prescription Clarification  Name of Medication: carbidopa-levodopa (SINEMET)  MG tablet  gabapentin (NEURONTIN) 300 MG capsule  gabapentin (NEURONTIN) 600 MG tablet    Prescribing Provider: Dr. Mills   Pharmacy: [facility] [address] [place] [address]   What on the order needs clarification? Yael from [facility] pharmacy is calling because they received the Pt prescriptions but are wanting to verify the start date. The start date that they received was 11/1/23. Please call Pharmacy to verify          Action Taken: Message routed to:  Other: [facility] Neurology    Travel Screening: Not Applicable

## 2023-06-07 NOTE — TELEPHONE ENCOUNTER
Dr. Mills,    Pt saw you today and you had sent gabapentin 600 MG, gabapentin 300 MG, and carbidopa-levodopa .    All meds had a start date on 11/1/2023.    Pharmacy called and want to verify if the start date is correct. Please verify. Thank you.    BEHZAD Young on 6/7/2023 at 11:10 AM

## 2023-06-22 ENCOUNTER — LAB REQUISITION (OUTPATIENT)
Dept: LAB | Facility: CLINIC | Age: 68
End: 2023-06-22

## 2023-06-22 DIAGNOSIS — D50.9 IRON DEFICIENCY ANEMIA, UNSPECIFIED: ICD-10-CM

## 2023-06-22 LAB
IRON BINDING CAPACITY (ROCHE): ABNORMAL
IRON SATN MFR SERPL: ABNORMAL %
IRON SERPL-MCNC: <5 UG/DL (ref 61–157)

## 2023-06-22 PROCEDURE — 83550 IRON BINDING TEST: CPT | Performed by: STUDENT IN AN ORGANIZED HEALTH CARE EDUCATION/TRAINING PROGRAM

## 2023-06-22 PROCEDURE — 82728 ASSAY OF FERRITIN: CPT | Performed by: STUDENT IN AN ORGANIZED HEALTH CARE EDUCATION/TRAINING PROGRAM

## 2023-06-23 LAB — FERRITIN SERPL-MCNC: 25 NG/ML (ref 31–409)

## 2023-08-19 ENCOUNTER — TELEPHONE (OUTPATIENT)
Dept: NEUROLOGY | Facility: CLINIC | Age: 68
End: 2023-08-19
Payer: COMMERCIAL

## 2023-08-19 NOTE — TELEPHONE ENCOUNTER
I received a call from the patient describing that his restless leg has been worse over the past 24 hours.  He was unable to sleep last night.  I reviewed the most recent note from Dr. Mills and recommended the following:    - Abstain from caffeine  - Discontinue his evening dose of Allegra as it has some antihistamine properties and can worsen RLS  - Increase evening dose of gabapentin from 600 to 900 mg at night.  He describes that he has previously tolerated gabapentin, though it can make him sleepy.  He understands that this increase will just be for this evening as a trial so that he can improve sleep.  He was appreciative of these recommendations and will follow up with Dr. Mills as scheduled.    Antoine Hugo MD

## 2023-09-07 ENCOUNTER — TRANSFERRED RECORDS (OUTPATIENT)
Dept: HEALTH INFORMATION MANAGEMENT | Facility: CLINIC | Age: 68
End: 2023-09-07
Payer: COMMERCIAL

## 2023-09-13 LAB
ALT SERPL-CCNC: 14 IU/L (ref 10–50)
AST SERPL-CCNC: 17 IU/L (ref 10–50)
CREATININE (EXTERNAL): 0.68 MG/DL (ref 0.7–1.2)
GFR ESTIMATED (EXTERNAL): >90 ML/MIN/1.73M2
GLUCOSE (EXTERNAL): 114 MG/DL (ref 70–99)
POTASSIUM (EXTERNAL): 4.7 MMOL/L (ref 3.5–5.1)

## 2023-09-25 ENCOUNTER — DOCUMENTATION ONLY (OUTPATIENT)
Dept: NEUROLOGY | Facility: CLINIC | Age: 68
End: 2023-09-25
Payer: COMMERCIAL

## 2023-09-25 NOTE — PROGRESS NOTES
Received office note from [facility]  Records Date of service: 9/22/23  Copy has been sent to scanning and copy was sent to provider

## 2023-10-09 ENCOUNTER — LAB REQUISITION (OUTPATIENT)
Dept: LAB | Facility: CLINIC | Age: 68
End: 2023-10-09

## 2023-10-09 DIAGNOSIS — D64.9 ANEMIA, UNSPECIFIED: ICD-10-CM

## 2023-10-09 LAB
IRON BINDING CAPACITY (ROCHE): 327 UG/DL (ref 240–430)
IRON SATN MFR SERPL: 12 % (ref 15–46)
IRON SERPL-MCNC: 40 UG/DL (ref 61–157)

## 2023-10-09 PROCEDURE — 82607 VITAMIN B-12: CPT | Performed by: STUDENT IN AN ORGANIZED HEALTH CARE EDUCATION/TRAINING PROGRAM

## 2023-10-09 PROCEDURE — 83550 IRON BINDING TEST: CPT | Performed by: STUDENT IN AN ORGANIZED HEALTH CARE EDUCATION/TRAINING PROGRAM

## 2023-10-10 LAB — VIT B12 SERPL-MCNC: 577 PG/ML (ref 232–1245)

## 2023-10-12 ENCOUNTER — TELEPHONE (OUTPATIENT)
Dept: NEUROLOGY | Facility: CLINIC | Age: 68
End: 2023-10-12
Payer: COMMERCIAL

## 2023-10-12 DIAGNOSIS — G25.81 RESTLESS LEG SYNDROME: Primary | ICD-10-CM

## 2023-10-12 NOTE — TELEPHONE ENCOUNTER
[facility] Call Center    Phone Message    May a detailed message be left on voicemail: yes     Reason for Call: Other: Galina macias pharmacists from patients primary care is requesting a call back in records to the request she had sent over back on 9/25/23. She would like to verbally discuss them with Dr. Mills. Please advise Galina at [phone].    Action Taken: Message routed to:  [facility] Neurology     Travel Screening: Not Applicable

## 2023-10-13 NOTE — TELEPHONE ENCOUNTER
Called and spoke with Galina, who is a pharmacist with [facility], and recently had a medication management (MTM) discussion with patient.    She wanted to provide some information about a couple suggested adjustments to the medication regimen patient is on and get feedback from Dr. Mills related to these suggested changes.    First, Galina had a discussion with patient about changing from the gabapentin they are currently taking, to considering taking pregabalin instead for symptom management.    Second, Galina talked with patient about tapering off and discontinuation of carbidopa levodopa, which per her report the patient already has tapered off.  Her rationale for stopping this medication is based on patient having intermittent restless legs and feeling the carbidopa-levodopa may be contributing to worsening of his symptoms. She was unsure of any other reason the patient may wish to stay on the Carbidopa-Levodopa.      Provider to please review this message and advise any follow-up communication that should occur with Galina., MTM pharmacist.    Rod Connell RN, BSN  [facility] Neurology

## 2023-10-19 NOTE — TELEPHONE ENCOUNTER
Called the number provided for Galina. Left a VM. I agree with her plan to switch gabapentin to pregabalin and to taper of carbidopa/levodopa IR.     Miracle Mills DO   of Neurology   [facility]

## 2023-11-04 ENCOUNTER — HEALTH MAINTENANCE LETTER (OUTPATIENT)
Age: 68
End: 2023-11-04

## 2023-11-29 RX ORDER — PREGABALIN 50 MG/1
CAPSULE ORAL
Qty: 120 CAPSULE | Refills: 5 | Status: SHIPPED | OUTPATIENT
Start: 2023-11-29 | End: 2024-01-03

## 2023-11-29 NOTE — CONFIDENTIAL NOTE
Discussed Restless Leg Syndrome management with Galina, PharmD, at [facility].  She reports that Patrick has self decreased off of carbidopa/levodopa IR.  In doing so, he reports improvement in memory.  However, restless leg syndrome symptoms continue to be an issue.  He has trialed gabapentin 3 times a day but could not remember to take the medication 3 times daily consistently.  Stopping gabapentin and starting pregabalin was discussed.  Patient's primary care provider, Dr. Lopez, would prefer that I prescribe this medication which I am happy to do for management of restless leg syndrome.    Plan:  - Start pregabalin 50 mg. Week 1, take 1 tab nightly. Week 2, take 2 tabs nightly. Week 3, take 3 tabs nightly. Week 4, take 4 tabs nightly. Stop at lowest effective dose.   - Side effects may include constipation, dry mouth, diplopia, weight gain, edema, sleepiness, ataxia, and difficulty with concentration or attention    - Call clinic in 5 week for an update    Miracle Mills DO   of Neurology   [facility]

## 2023-12-06 ENCOUNTER — TELEPHONE (OUTPATIENT)
Dept: NEUROLOGY | Facility: CLINIC | Age: 68
End: 2023-12-06
Payer: COMMERCIAL

## 2023-12-06 NOTE — TELEPHONE ENCOUNTER
Patient called with questions about medication for Gabapentin and new start medication.     Please contact patient .    Thank you

## 2023-12-07 NOTE — TELEPHONE ENCOUNTER
Called and spoke with patient, who just was providing an FYI as to how he transitioned to a new RLS medication, pregabalin (started differently then in previous notation).    Patient, instead of directed, tapered their doses of gabapentin, just taking the bedtime dose for a couple days, and then stopping on the third night, replacing with the starting dose of pregabalin 50mg.     Patient is currently in Week 1 of the scheduled taper of pregabalin, and will discuss this more with Dr. Mills at an upcoming OV on 12/15.    FYI for MD. Rod Connell, RN, BSN  [facility] Neurology

## 2023-12-15 ENCOUNTER — LAB (OUTPATIENT)
Dept: LAB | Facility: CLINIC | Age: 68
End: 2023-12-15
Payer: COMMERCIAL

## 2023-12-15 ENCOUNTER — OFFICE VISIT (OUTPATIENT)
Dept: NEUROLOGY | Facility: CLINIC | Age: 68
End: 2023-12-15
Payer: COMMERCIAL

## 2023-12-15 ENCOUNTER — TELEPHONE (OUTPATIENT)
Dept: NEUROLOGY | Facility: CLINIC | Age: 68
End: 2023-12-15

## 2023-12-15 VITALS — DIASTOLIC BLOOD PRESSURE: 78 MMHG | SYSTOLIC BLOOD PRESSURE: 147 MMHG

## 2023-12-15 DIAGNOSIS — G25.81 RESTLESS LEG SYNDROME: ICD-10-CM

## 2023-12-15 DIAGNOSIS — G25.81 RESTLESS LEG SYNDROME: Primary | ICD-10-CM

## 2023-12-15 LAB
ALBUMIN SERPL BCG-MCNC: 4.4 G/DL (ref 3.5–5.2)
ALP SERPL-CCNC: 95 U/L (ref 40–150)
ALT SERPL W P-5'-P-CCNC: 5 U/L (ref 0–70)
ANION GAP SERPL CALCULATED.3IONS-SCNC: 11 MMOL/L (ref 7–15)
AST SERPL W P-5'-P-CCNC: 17 U/L (ref 0–45)
BILIRUB SERPL-MCNC: 0.4 MG/DL
BUN SERPL-MCNC: 6.5 MG/DL (ref 8–23)
CALCIUM SERPL-MCNC: 10.2 MG/DL (ref 8.8–10.2)
CHLORIDE SERPL-SCNC: 97 MMOL/L (ref 98–107)
CREAT SERPL-MCNC: 0.71 MG/DL (ref 0.67–1.17)
DEPRECATED HCO3 PLAS-SCNC: 28 MMOL/L (ref 22–29)
EGFRCR SERPLBLD CKD-EPI 2021: >90 ML/MIN/1.73M2
FERRITIN SERPL-MCNC: 26 NG/ML (ref 31–409)
GLUCOSE SERPL-MCNC: 113 MG/DL (ref 70–99)
HBA1C MFR BLD: 6.4 %
IRON BINDING CAPACITY (ROCHE): 340 UG/DL (ref 240–430)
IRON SATN MFR SERPL: 14 % (ref 15–46)
IRON SERPL-MCNC: 47 UG/DL (ref 61–157)
POTASSIUM SERPL-SCNC: 4.5 MMOL/L (ref 3.4–5.3)
PROT SERPL-MCNC: 7.5 G/DL (ref 6.4–8.3)
SODIUM SERPL-SCNC: 136 MMOL/L (ref 135–145)
TSH SERPL DL<=0.005 MIU/L-ACNC: 8.45 UIU/ML (ref 0.3–4.2)

## 2023-12-15 PROCEDURE — 82728 ASSAY OF FERRITIN: CPT

## 2023-12-15 PROCEDURE — 83550 IRON BINDING TEST: CPT

## 2023-12-15 PROCEDURE — 80053 COMPREHEN METABOLIC PANEL: CPT

## 2023-12-15 PROCEDURE — 99214 OFFICE O/P EST MOD 30 MIN: CPT | Performed by: PSYCHIATRY & NEUROLOGY

## 2023-12-15 PROCEDURE — 36415 COLL VENOUS BLD VENIPUNCTURE: CPT

## 2023-12-15 PROCEDURE — 84443 ASSAY THYROID STIM HORMONE: CPT

## 2023-12-15 PROCEDURE — 83036 HEMOGLOBIN GLYCOSYLATED A1C: CPT

## 2023-12-15 NOTE — TELEPHONE ENCOUNTER
----- Message from Miracle Mills DO sent at 12/15/2023  3:14 PM CST -----  Team - please inform Patrick that his A1c is in the prediabetic range, TSH is very elevated, iron panel is abnormal and iron level is low. I am awaiting ferritin level. Regarding A1c and TSH, he should schedule an appt with Primary Care Provider for management of these conditions.  He should also discuss with Primary Care Provider regarding reason for low iron and management for low iron. Theses conditions are likely also could be contributing to his poorly controlled Restless Leg Syndrome.     Thank you!    Miracle

## 2023-12-15 NOTE — PROGRESS NOTES
"Department of Neurology  Movement Disorders Division     Patient: Patrick Leal   MRN: [id number]   : 1955   Date of Visit:  December 15, 2023    History of Present Illness  Mr. Leal is a pleasant 68 year old male that presents to Neurology Movement clinic for follow up regarding Restless Leg Syndrome management.   Patient was last seen on 2023 where no changes were made to Restless Leg Syndrome medication regimen.      History obtained from patient.  Main complaint: pain in legs  Patient reports that pain in legs have become unbearable. Pain is described as generalized sharpness and more than an ache and interferes with sleep. Starts in thighs. He pounds into his thigh which gives him moments of relief. This pain is on/off for a few months. He reports weakness in legs and has a hard time walking distances or standing; this is \"newer.\" Stopped carbidopa/levodopa IR and gabapentin. Started lyrica 23.   23: Patient, instead of directed, tapered their doses of gabapentin, just taking the bedtime dose for a couple days, and then stopping on the third night, replacing with the starting dose of pregabalin 50mg. Patient is currently in Week 1 of the scheduled taper of pregabalin.  When laying down about ready to fall asleep \"it hits, the pain\". Occasionaly, Restless Leg Syndrome or pain symptoms worsen prior to laying down.  Not drinking caffeine.     Following with Dr. Miller for Mood management.   Movement Disorder-related Medications                   9:30AM PM 1PM PRN 9-12 PM  am 10-11 pm    Pramipexole 0.5mg - not taking                                                             Ropinirole 1 mg           3   Gabapentin 300mg  1   1             Gabapentin 600mg                      1      Carbidopa/levodopa IR 25/100 - not taking          Duloxetine 60mg 1      1      Pregabalin 50 mg     2          Review of Systems:  Other than that mentioned above, the remainder of 12 systems " reviewed were negative.    PMH: unchanged  PSH: unchanged  FH: unchanged  SH: unchanged    Medications:  Current Outpatient Medications   Medication Sig Dispense Refill    aspirin 81 MG EC tablet Take 81 mg by mouth daily      atorvastatin (LIPITOR) 10 MG tablet Take 1 tablet by mouth daily      blood glucose (OPTIUM TEST STRIPS) test strip Dispense item covered by pt ins. E11.9 NIDDM type II - Test 1 time/day      blood glucose monitoring (ACCU-CHEK FASTCLIX) lancets Use as directed. Test 1 times per day.      calcium carbonate 1250 MG/5ML SUSP suspension       Cholecalciferol (VITAMIN D) 2000 UNITS CAPS Take 1 capsule by mouth daily       chromium 200 MCG CAPS Take 200 mcg by mouth daily.      cinnamon 500 MG CAPS Take 1,000 mg by mouth 2 times daily       Contour Next EZ (CONTOUR NEXT EZ W/DEVICE KIT) w/Device KIT USE TO TEST BLOOD SUGARS 1 TIME DAILY.  E11.9 NIDDM TYPE II      diazepam (VALIUM) 5 MG tablet Take 1 tablet by mouth 2 times daily if needed for Muscle Spasm.      diclofenac (VOLTAREN) 1 % GEL Place 4 g onto the skin 4 times daily      DULoxetine (CYMBALTA) 60 MG capsule Take 2 capsules (120 mg) by mouth daily 90 capsule 0    estradiol (VIVELLE-DOT) 0.025 MG/24HR Place 1 patch onto the skin twice a week 8 patch 7    FEROSUL 325 (65 Fe) MG tablet Take 1 Tablet (325 mg) by mouth once daily. Take on an empty stomach or with orange or tomato juice.      fluticasone (FLONASE) 50 MCG/ACT nasal spray Spray 1 spray into both nostrils daily as needed for rhinitis or allergies      fluticasone (FLOVENT DISKUS) 50 MCG/BLIST AEPB Inhale 1 puff into the lungs as needed       levothyroxine (SYNTHROID/LEVOTHROID) 200 MCG tablet Take 200 mcg by mouth daily      lidocaine (LIDODERM) 5 % patch Place 2 patches onto the skin as needed       LORAZEPAM PO Take 1 mg by mouth as needed       metFORMIN (GLUCOPHAGE) 500 MG tablet Take 500 mg by mouth 2 times daily Restart tomorrow (Fri) 60 tablet     ondansetron (ZOFRAN) 4 MG  tablet Take 4 mg by mouth every 6 hours as needed      pantoprazole (PROTONIX) 40 MG EC tablet Take 40 mg by mouth daily      pregabalin (LYRICA) 50 MG capsule Week 1, take 1 tab nightly. Week 2, take 2 tabs nightly. Week 3, take 3 tabs nightly. Week 4, take 4 tabs nightly. Stop at lowest effective dose. 120 capsule 5    rOPINIRole (REQUIP) 1 MG tablet Take 1 tablet (1 mg) by mouth once daily. May increase by 1 mg every 2 days for a max of 4 mg daily For restless leg      semaglutide (OZEMPIC) 2 MG/1.5ML SOPN pen Inject 0.5 mg Subcutaneous every 7 days      senna-docusate (SENOKOT-S/PERICOLACE) 8.6-50 MG tablet Take 1 tablet by mouth daily as needed      traZODone (DESYREL) 100 MG tablet Take 2 tablets by mouth nightly as needed       vitamin B complex with vitamin C (STRESS TAB) tablet Take 1 tablet by mouth daily      vitamin E 400 UNITS TABS Take 400 Units by mouth daily      carbidopa-levodopa (SINEMET)  MG tablet Take 1 tablet by mouth 2 times daily (Patient not taking: Reported on 12/15/2023) 180 tablet 3    gabapentin (NEURONTIN) 300 MG capsule Take 1 capsule (300 mg) by mouth 2 times daily (Patient not taking: Reported on 12/15/2023) 180 capsule 3    gabapentin (NEURONTIN) 600 MG tablet Take 1 tablet (600 mg) by mouth At Bedtime (Patient not taking: Reported on 12/15/2023) 90 tablet 3           Allergies   Allergen Reactions    Zolpidem Tartrate      SLEEP WALKING    Amoxicillin      Stomach upset    Bacitracin      Other reaction(s): *Unknown    Erythromycin      Other reaction(s): GI Upset    Morphine      Other reaction(s): flushed feeling, Flushing  Facial flushing      Zolpidem Other (See Comments)     Other reaction(s): Agitation, Confusion, Hallucinations  SLEEP WALKING  Sleep Walking            Physical Exam:  The patient's  blood pressure is 147/78 (abnormal).        Data Reviewed: I have personally reviewed the tests/studies below.   Lab Results   Component Value Date    A1C 6.1 10/14/2015     A1C 6.0 07/28/2014    A1C 6.4 03/09/2013    A1C 7.1@ 08/30/2012    A1C 7.1 07/18/2012     TSH   Date Value Ref Range Status   03/09/2013 4.48 0.4 - 5.0 mU/L Final     CBC RESULTS:   Recent Labs   Lab Test 10/17/15  0500   WBC 10.1   RBC 4.61   HGB 13.5   HCT 39.7*   MCV 86   MCH 29.3   MCHC 34.0   RDW 12.9        Last Comprehensive Metabolic Panel:  Sodium   Date Value Ref Range Status   02/22/2022 132 (L) 136 - 145 mmol/L Final   10/17/2015 135 133 - 144 mmol/L Final     Potassium   Date Value Ref Range Status   02/22/2022 3.4 (L) 3.5 - 5.0 mmol/L Final   10/17/2015 3.1 (L) 3.4 - 5.3 mmol/L Final     Chloride   Date Value Ref Range Status   02/22/2022 96 (L) 98 - 107 mmol/L Final   10/17/2015 98 94 - 109 mmol/L Final     Carbon Dioxide   Date Value Ref Range Status   10/17/2015 28 20 - 32 mmol/L Final     Carbon Dioxide (CO2)   Date Value Ref Range Status   02/22/2022 24 22 - 31 mmol/L Final     Anion Gap   Date Value Ref Range Status   02/22/2022 12 5 - 18 mmol/L Final   10/17/2015 9 3 - 14 mmol/L Final     Glucose   Date Value Ref Range Status   02/22/2022 143 (H) 70 - 125 mg/dL Final   10/17/2015 105 (H) 70 - 99 mg/dL Final     Urea Nitrogen   Date Value Ref Range Status   02/22/2022 6 (L) 8 - 22 mg/dL Final   10/17/2015 12 7 - 30 mg/dL Final     Creatinine   Date Value Ref Range Status   02/22/2022 0.69 (L) 0.70 - 1.30 mg/dL Final   10/17/2015 0.75 0.66 - 1.25 mg/dL Final     GFR Estimate   Date Value Ref Range Status   02/22/2022 >90 >60 mL/min/1.73m2 Final     Comment:     Effective December 21, 2021 eGFRcr in adults is calculated using the 2021 CKD-EPI creatinine equation which includes age and gender (Nano et al., NEJM, DOI: 10.1056/WCZBjt9125219)   10/17/2015 >90  Non  GFR Calc   >60 mL/min/1.7m2 Final     Calcium   Date Value Ref Range Status   02/22/2022 8.9 8.5 - 10.5 mg/dL Final   10/17/2015 9.1 8.5 - 10.1 mg/dL Final     Bilirubin Total   Date Value Ref Range Status    02/22/2022 0.5 0.0 - 1.0 mg/dL Final   10/14/2015 0.4 0.2 - 1.3 mg/dL Final     Alkaline Phosphatase   Date Value Ref Range Status   02/22/2022 92 45 - 120 U/L Final   10/14/2015 58 40 - 150 U/L Final     ALT   Date Value Ref Range Status   02/22/2022 <9 0 - 45 U/L Final   10/14/2015 20 0 - 70 U/L Final     AST   Date Value Ref Range Status   02/22/2022 17 0 - 40 U/L Final   10/14/2015 14 0 - 45 U/L Final       Impression:  Patrick Leal is a 68 year old male with  Hartmann-Garrido variant Guillain-Sloan syndrome, diabetes with peripheral neuropathy, and restless legs syndrome. He is treated with a combination of gabapentin, ropinirole, and Sinemet.. The patient's main concern today is Restless Leg Syndrome symptoms. We discussed     Restless Leg Syndrome: Developed in 2002 treatment was initiated after he was diagnosed with Guillain-Barré syndrome. He was initially started on pramipexole.  Carbidopa/levodopa was added about 1-1.5 years ago when he had worsening of his restless legs again. He was initially on gabapentin for his back pain, but the bedtime dose was increase for RLS. He stopped taking pramipexole (unclear reason) and was started on ropinirole with good Restless Leg Syndrome treatment from another provider.  Guillain-Sloan: Diagnosed in 2015 requiring a 10 day hospitalization followed by rehab for 2 weeks. He presented with facial weakness, and lower extremity weakness. Prior to the GBS he was walking at least 5 miles per day, but since then he has not been able to walk more than a mile  DM2     Plan:   - Labs: A1c, iron panel, CMP, TSH  - Increase Lyrica 50 mg weekly to goal dose of 300 mg nightly  Lyrica 50 mg                  am        Week 1 3        Week 2 4        Week 3 5        Week 4 6        - Okay to increase to ropinirole 3.5 mg nightly during this transition, ropinirole 0.5 mg provided.    - MTM (Neurology Pharmacy) referral placed for close follow up and continued medication  management. Follow up should be scheduled in 4-5 weeks. Please call the clinic at [phone] if you do not hear back in a week.   - Continue to exercise daily and safely      DRUGS WHICH MAY MAKE RESTLESS LEGS WORSE:  - Antipsychotics  - Dopamine blocking antiemetics  - Centrally acting antihistamines, such as diphenhydramine  - Serotonergic medications  - Serotonergic antidepressants (except bupropion)   - Caffeine     Patient to return in 3-5 months, for in-person visit, 30 minutes.     Miracle Mills DO, MA   of Neurology   [facility]    30 minutes spent on date of encounter doing chart reviews and exam and documentation and further activities as noted above.

## 2023-12-15 NOTE — TELEPHONE ENCOUNTER
Attempted to call pt to relay provider message regarding results-- see below.      Advised to check mychart, and can call back to clinic with questions    Nova LACY RN, BSN  [facility]

## 2023-12-15 NOTE — PATIENT INSTRUCTIONS
Plan:   - Labs: A1c, iron panel, CMP, TSH  - Increase Lyrica 50 mg weekly to goal dose of 300 mg nightly  Lyrica 50 mg                  am        Week 1 3        Week 2 4        Week 3 5        Week 4 6        - Okay to increase to ropinirole 3.5 mg nightly during this transition    - MTM (Neurology Pharmacy) referral placed for close follow up and continued medication management. Follow up should be scheduled in 5 weeks. Please call the clinic at [phone] if you do not hear back in a week.   - Continue to exercise daily and safely      DRUGS WHICH MAY MAKE RESTLESS LEGS WORSE:  - Antipsychotics  - Dopamine blocking antiemetics  - Centrally acting antihistamines, such as diphenhydramine  - Serotonergic medications  - Serotonergic antidepressants (except bupropion)   - Caffeine     Patient to return in 3-5 months, for in-person visit, 30 minutes.

## 2023-12-15 NOTE — Clinical Note
Coordinators, please schedule follow up with Dr. Yanira Glasgow, PharmD, or Dr. Tiffany Mcdaniel, PharmD, in 5 weeks for close follow up and continued medication management.    Thank you,  Miracle

## 2023-12-15 NOTE — Clinical Note
Coordinators, please schedule follow up with MTM pharamacy (mary GIBBONS or Janet GIBBONS) in 3-4 weeks for close follow up and continued medication management.    Thank you,  Miracle

## 2023-12-15 NOTE — NURSING NOTE
Chief Complaint   Patient presents with     Follow Up       BEHZAD Young on 12/15/2023 at 11:42 AM

## 2023-12-15 NOTE — LETTER
"    12/15/2023         RE: Patrick Leal  [address]  [facility] [place] [address]        Dear Colleague,    Thank you for referring your patient, Patrick Leal, to the [facility]. Please see a copy of my visit note below.    Department of Neurology  Movement Disorders Division     Patient: Patrick Leal   MRN: [id number]   : 1955   Date of Visit:  December 15, 2023    History of Present Illness  Mr. Leal is a pleasant 68 year old male that presents to Neurology Movement clinic for follow up regarding Restless Leg Syndrome management.   Patient was last seen on 2023 where no changes were made to Restless Leg Syndrome medication regimen.      History obtained from patient.  Main complaint: pain in legs  Patient reports that pain in legs have become unbearable. Pain is described as generalized sharpness and more than an ache and interferes with sleep. Starts in thighs. He pounds into his thigh which gives him moments of relief. This pain is on/off for a few months. He reports weakness in legs and has a hard time walking distances or standing; this is \"newer.\" Stopped carbidopa/levodopa IR and gabapentin. Started lyrica 23.   23: Patient, instead of directed, tapered their doses of gabapentin, just taking the bedtime dose for a couple days, and then stopping on the third night, replacing with the starting dose of pregabalin 50mg. Patient is currently in Week 1 of the scheduled taper of pregabalin.  When laying down about ready to fall asleep \"it hits, the pain\". Occasionaly, Restless Leg Syndrome or pain symptoms worsen prior to laying down.  Not drinking caffeine.     Following with Dr. Miller for Mood management.   Movement Disorder-related Medications                   9:30AM PM 1PM PRN 9-12 PM  am 10-11 pm    Pramipexole 0.5mg - not taking                                                             Ropinirole 1 mg          "  3   Gabapentin 300mg  1   1             Gabapentin 600mg                      1      Carbidopa/levodopa IR 25/100 - not taking          Duloxetine 60mg 1      1      Pregabalin 50 mg     2          Review of Systems:  Other than that mentioned above, the remainder of 12 systems reviewed were negative.    PMH: unchanged  PSH: unchanged  FH: unchanged  SH: unchanged    Medications:  Current Outpatient Medications   Medication Sig Dispense Refill     aspirin 81 MG EC tablet Take 81 mg by mouth daily       atorvastatin (LIPITOR) 10 MG tablet Take 1 tablet by mouth daily       blood glucose (OPTIUM TEST STRIPS) test strip Dispense item covered by pt ins. E11.9 NIDDM type II - Test 1 time/day       blood glucose monitoring (ACCU-CHEK FASTCLIX) lancets Use as directed. Test 1 times per day.       calcium carbonate 1250 MG/5ML SUSP suspension        Cholecalciferol (VITAMIN D) 2000 UNITS CAPS Take 1 capsule by mouth daily        chromium 200 MCG CAPS Take 200 mcg by mouth daily.       cinnamon 500 MG CAPS Take 1,000 mg by mouth 2 times daily        Contour Next EZ (CONTOUR NEXT EZ W/DEVICE KIT) w/Device KIT USE TO TEST BLOOD SUGARS 1 TIME DAILY.  E11.9 NIDDM TYPE II       diazepam (VALIUM) 5 MG tablet Take 1 tablet by mouth 2 times daily if needed for Muscle Spasm.       diclofenac (VOLTAREN) 1 % GEL Place 4 g onto the skin 4 times daily       DULoxetine (CYMBALTA) 60 MG capsule Take 2 capsules (120 mg) by mouth daily 90 capsule 0     estradiol (VIVELLE-DOT) 0.025 MG/24HR Place 1 patch onto the skin twice a week 8 patch 7     FEROSUL 325 (65 Fe) MG tablet Take 1 Tablet (325 mg) by mouth once daily. Take on an empty stomach or with orange or tomato juice.       fluticasone (FLONASE) 50 MCG/ACT nasal spray Spray 1 spray into both nostrils daily as needed for rhinitis or allergies       fluticasone (FLOVENT DISKUS) 50 MCG/BLIST AEPB Inhale 1 puff into the lungs as needed        levothyroxine (SYNTHROID/LEVOTHROID) 200 MCG  tablet Take 200 mcg by mouth daily       lidocaine (LIDODERM) 5 % patch Place 2 patches onto the skin as needed        LORAZEPAM PO Take 1 mg by mouth as needed        metFORMIN (GLUCOPHAGE) 500 MG tablet Take 500 mg by mouth 2 times daily Restart tomorrow (Fri) 60 tablet      ondansetron (ZOFRAN) 4 MG tablet Take 4 mg by mouth every 6 hours as needed       pantoprazole (PROTONIX) 40 MG EC tablet Take 40 mg by mouth daily       pregabalin (LYRICA) 50 MG capsule Week 1, take 1 tab nightly. Week 2, take 2 tabs nightly. Week 3, take 3 tabs nightly. Week 4, take 4 tabs nightly. Stop at lowest effective dose. 120 capsule 5     rOPINIRole (REQUIP) 1 MG tablet Take 1 tablet (1 mg) by mouth once daily. May increase by 1 mg every 2 days for a max of 4 mg daily For restless leg       semaglutide (OZEMPIC) 2 MG/1.5ML SOPN pen Inject 0.5 mg Subcutaneous every 7 days       senna-docusate (SENOKOT-S/PERICOLACE) 8.6-50 MG tablet Take 1 tablet by mouth daily as needed       traZODone (DESYREL) 100 MG tablet Take 2 tablets by mouth nightly as needed        vitamin B complex with vitamin C (STRESS TAB) tablet Take 1 tablet by mouth daily       vitamin E 400 UNITS TABS Take 400 Units by mouth daily       carbidopa-levodopa (SINEMET)  MG tablet Take 1 tablet by mouth 2 times daily (Patient not taking: Reported on 12/15/2023) 180 tablet 3     gabapentin (NEURONTIN) 300 MG capsule Take 1 capsule (300 mg) by mouth 2 times daily (Patient not taking: Reported on 12/15/2023) 180 capsule 3     gabapentin (NEURONTIN) 600 MG tablet Take 1 tablet (600 mg) by mouth At Bedtime (Patient not taking: Reported on 12/15/2023) 90 tablet 3           Allergies   Allergen Reactions     Zolpidem Tartrate      SLEEP WALKING     Amoxicillin      Stomach upset     Bacitracin      Other reaction(s): *Unknown     Erythromycin      Other reaction(s): GI Upset     Morphine      Other reaction(s): flushed feeling, Flushing  Facial flushing       Zolpidem  Other (See Comments)     Other reaction(s): Agitation, Confusion, Hallucinations  SLEEP WALKING  Sleep Walking            Physical Exam:  The patient's  blood pressure is 147/78 (abnormal).        Data Reviewed: I have personally reviewed the tests/studies below.   Lab Results   Component Value Date    A1C 6.1 10/14/2015    A1C 6.0 07/28/2014    A1C 6.4 03/09/2013    A1C 7.1@ 08/30/2012    A1C 7.1 07/18/2012     TSH   Date Value Ref Range Status   03/09/2013 4.48 0.4 - 5.0 mU/L Final     CBC RESULTS:   Recent Labs   Lab Test 10/17/15  0500   WBC 10.1   RBC 4.61   HGB 13.5   HCT 39.7*   MCV 86   MCH 29.3   MCHC 34.0   RDW 12.9        Last Comprehensive Metabolic Panel:  Sodium   Date Value Ref Range Status   02/22/2022 132 (L) 136 - 145 mmol/L Final   10/17/2015 135 133 - 144 mmol/L Final     Potassium   Date Value Ref Range Status   02/22/2022 3.4 (L) 3.5 - 5.0 mmol/L Final   10/17/2015 3.1 (L) 3.4 - 5.3 mmol/L Final     Chloride   Date Value Ref Range Status   02/22/2022 96 (L) 98 - 107 mmol/L Final   10/17/2015 98 94 - 109 mmol/L Final     Carbon Dioxide   Date Value Ref Range Status   10/17/2015 28 20 - 32 mmol/L Final     Carbon Dioxide (CO2)   Date Value Ref Range Status   02/22/2022 24 22 - 31 mmol/L Final     Anion Gap   Date Value Ref Range Status   02/22/2022 12 5 - 18 mmol/L Final   10/17/2015 9 3 - 14 mmol/L Final     Glucose   Date Value Ref Range Status   02/22/2022 143 (H) 70 - 125 mg/dL Final   10/17/2015 105 (H) 70 - 99 mg/dL Final     Urea Nitrogen   Date Value Ref Range Status   02/22/2022 6 (L) 8 - 22 mg/dL Final   10/17/2015 12 7 - 30 mg/dL Final     Creatinine   Date Value Ref Range Status   02/22/2022 0.69 (L) 0.70 - 1.30 mg/dL Final   10/17/2015 0.75 0.66 - 1.25 mg/dL Final     GFR Estimate   Date Value Ref Range Status   02/22/2022 >90 >60 mL/min/1.73m2 Final     Comment:     Effective December 21, 2021 eGFRcr in adults is calculated using the 2021 CKD-EPI creatinine equation which  includes age and gender (Nano whipple al., NEJ, DOI: 10.1056/GYRFbd5914982)   10/17/2015 >90  Non  GFR Calc   >60 mL/min/1.7m2 Final     Calcium   Date Value Ref Range Status   02/22/2022 8.9 8.5 - 10.5 mg/dL Final   10/17/2015 9.1 8.5 - 10.1 mg/dL Final     Bilirubin Total   Date Value Ref Range Status   02/22/2022 0.5 0.0 - 1.0 mg/dL Final   10/14/2015 0.4 0.2 - 1.3 mg/dL Final     Alkaline Phosphatase   Date Value Ref Range Status   02/22/2022 92 45 - 120 U/L Final   10/14/2015 58 40 - 150 U/L Final     ALT   Date Value Ref Range Status   02/22/2022 <9 0 - 45 U/L Final   10/14/2015 20 0 - 70 U/L Final     AST   Date Value Ref Range Status   02/22/2022 17 0 - 40 U/L Final   10/14/2015 14 0 - 45 U/L Final       Impression:  Patrick Leal is a 68 year old male with  Hartmann-Garrido variant Guillain-Hammond syndrome, diabetes with peripheral neuropathy, and restless legs syndrome. He is treated with a combination of gabapentin, ropinirole, and Sinemet.. The patient's main concern today is Restless Leg Syndrome symptoms. We discussed     Restless Leg Syndrome: Developed in 2002 treatment was initiated after he was diagnosed with Guillain-Barré syndrome. He was initially started on pramipexole.  Carbidopa/levodopa was added about 1-1.5 years ago when he had worsening of his restless legs again. He was initially on gabapentin for his back pain, but the bedtime dose was increase for RLS. He stopped taking pramipexole (unclear reason) and was started on ropinirole with good Restless Leg Syndrome treatment from another provider.  Guillain-Hammond: Diagnosed in 2015 requiring a 10 day hospitalization followed by rehab for 2 weeks. He presented with facial weakness, and lower extremity weakness. Prior to the GBS he was walking at least 5 miles per day, but since then he has not been able to walk more than a mile  DM2     Plan:   - Labs: A1c, iron panel, CMP, TSH  - Increase Lyrica 50 mg weekly to goal dose of 300  mg nightly  Lyrica 50 mg                  am        Week 1 3        Week 2 4        Week 3 5        Week 4 6        - Okay to increase to ropinirole 3.5 mg nightly during this transition    - MTM (Neurology Pharmacy) referral placed for close follow up and continued medication management. Follow up should be scheduled in 4-5 weeks. Please call the clinic at [phone] if you do not hear back in a week.   - Continue to exercise daily and safely      DRUGS WHICH MAY MAKE RESTLESS LEGS WORSE:  - Antipsychotics  - Dopamine blocking antiemetics  - Centrally acting antihistamines, such as diphenhydramine  - Serotonergic medications  - Serotonergic antidepressants (except bupropion)   - Caffeine     Patient to return in 3-5 months, for in-person visit, 30 minutes.     Miracle Mills DO, MA   of Neurology   [facility]    30 minutes spent on date of encounter doing chart reviews and exam and documentation and further activities as noted above.                    Again, thank you for allowing me to participate in the care of your patient.        Sincerely,        Miracle Mills DO

## 2023-12-15 NOTE — Clinical Note
Friends, how fast can I push Lyrica to treat Restless Leg Syndrome? I am placing a MTM referral for close follow up and Lyrica management. Follow up in 3-4 weeks please. I sent a message to clinic coordinators.  Thank you!  Miracle

## 2023-12-21 ENCOUNTER — MYC MEDICAL ADVICE (OUTPATIENT)
Dept: NEUROLOGY | Facility: CLINIC | Age: 68
End: 2023-12-21
Payer: COMMERCIAL

## 2023-12-21 RX ORDER — ROPINIROLE 0.5 MG/1
TABLET, FILM COATED ORAL
Qty: 30 TABLET | Refills: 11 | Status: SHIPPED | OUTPATIENT
Start: 2023-12-21 | End: 2024-06-12

## 2023-12-21 NOTE — TELEPHONE ENCOUNTER
"Dr. Mills,    Pt had a visit with you on 12/15/2023. And per your notes, it says, \"okay to increase to ropinirole 3.5 mg nightly during this transition.\"    Per sent a [employer] message stating that he is Ropinirole does not cut well and is asking if you would be okay sending a Rx for rOPINIRole (REQUIP) 0.5 MG tablet.    Please advise. Thank you.      BEHZAD Young on 12/21/2023 at 11:25 AM    "

## 2023-12-21 NOTE — TELEPHONE ENCOUNTER
Ropinirole 0.5 mg send to pharmacy sent to patient's pharmacy and to be taken with ropinirole 1 mg 3 tabs to equal 3.5 mg nightly.     Miracle Mills DO  Movement Disorders Specialist  [facility]

## 2024-01-03 DIAGNOSIS — G25.81 RESTLESS LEG SYNDROME: ICD-10-CM

## 2024-01-03 NOTE — TELEPHONE ENCOUNTER
RX Authorization    Medication: Pregabalin oral capsule 50mg    Date last refill ordered:    Quantity ordered:    # refills:    Date of last clinic visit with ordering provider:    Date of next clinic visit with ordering provider:    All pertinent protocol data (lab date/result):    Include pertinent information from patients message:   Patient requesting dose increase to 300mg

## 2024-01-04 RX ORDER — PREGABALIN 50 MG/1
CAPSULE ORAL
Qty: 180 CAPSULE | Refills: 5 | Status: SHIPPED | OUTPATIENT
Start: 2024-01-04 | End: 2024-06-12 | Stop reason: DRUGHIGH

## 2024-01-04 NOTE — TELEPHONE ENCOUNTER
Dr. Mills,    We received a electronic refill request from the pharmacy for Pregabalin 50 MG. And in the comments section, pt is requesting for an increase in dose for 300 MG.    Please review and if ok, please send new Rx to the pharmacy. Thank you.    BEHZAD Young on 1/4/2024 at 10:40 AM

## 2024-01-17 DIAGNOSIS — G25.81 RESTLESS LEG SYNDROME: Primary | ICD-10-CM

## 2024-01-17 NOTE — PROGRESS NOTES
Medication Therapy Management (MTM) Encounter    ASSESSMENT:                            Medication Adherence/Access: No issues identified    GERD:   Might consider a dose reduction to determine if medication remains necessary.  Chronic PPI use places patient at an increased risk of C. Diff, hypomagnesemia and lower bone mineral density, these risks were reviewed with the patient.     S/p orchiectomy:  There is a liquid calcium supplement available by various manufacturers. Information sent.    Diabetes   Discussed that he could move all the metformin to the evening in effort to improve adherence, which he will consider. Discussed reducing his soda, and thereby his sugar and caffeine, consumption. He set goal to start by reducing/cutting the afternoon soda. He would like to continue trying to take morning meds and will consider moving full metformin dose to the evening.    Hyperlipidemia   Stable. Continue current medication.    Hypothyroidism    Many of his current symptoms could be related to elevated TSH. Discussed various methods by which to improve medication adherence. He is missing multiple medications in effort to separate levothyroxine and then forgetting the rest of the medications. May consider consolidating the morning medications and adjusting the levothyroxine dose accordingly, if it seems to have altered absorption.   Encouraged him to continue efforts at  for now and discuss with PCP. He will also begin taking the lower 175mcg on Mondays and Thursdays, as prescribed.    Restless legs:   Discussed trying to further increase the pregabalin and move it to earlier in the evening in order to avoid hungover feeling in the morning, which may allow ropinirole reduction. Patient is currently working on simplifying med regimen by reducing times he needs to take medication during the day, so adding another dosing time in the evening for pregabalin right now is not likely to be remembered. Also  "discussed potential impact that caffeine has on restless legs, as his soda consumption seems quite high. Mutually set goal to start reducing the afternoon soda. Will continue with current regimen and he will consider this plan.    PLAN:                            -Start reducing afternoon soda, with goal of cutting it completely  -Discussed moving the full metformin dose to the evening, but you did not want to make this change today  -Liquid calcium supplement is available called Nature's Blend Liquid Calcium Citrate found on www.[employer]  -There is also a powder to be mixed with water, called NOW calcium citrate powder. You can find it on www.[employer]   Talk with your primary care doctor about:  -consider reducing pantoprazole to determine if medication is still needed  -consider taking levothyroxine with your other morning meds to help with remembering to take them every day.     Follow-up: Thursday, 1/25/24 2:30pm    SUBJECTIVE/OBJECTIVE:                          Patrick Leal is a 68 year old male called for an initial visit. He was referred to me from Dr. Mills.    Patient has met with MTM through [facility].    Reason for visit: med review.    Allergies/ADRs: Reviewed in chart; Juan Luis Kingsley  Past Medical History: Reviewed in chart  Tobacco: He reports that he has never smoked. He has never used smokeless tobacco.  Alcohol: rarely  Caffeine: large fountain Pepsi daily (50-60oz), sometimes additional bottle    Medication Adherence/Access: forgets morning doses \"way too frequently\" plus tends to ignore reminders \"because I rebel at structure\". \"I'd like to think I take morning doses at least twice a week\"  PM meds are usually about 30 minutes before bedtime and bedtime is not consistent  -Uses AM and PM pill boxes, but struggling with being able to separate levothyroxine and remember second set of morning doses    GERD:   Pantoprazole 40mg once daily   Patient feels that current regimen is " "effective, has helped with belching. Has been on for longer than 10 years.     S/p orchiectomy:  -estradiol 0.025mg patch twice weekly  -Vitamin D 2000 units    States that he has difficulty swallowing the large calcium supplements and cannot do chewable due to issues with his dentures.      Diabetes   -metformin ER 1000mg BID (usually forgets morning dose)  Aspirin 81mg daily  Previously significant GI issues with IR metformin, but have improved with using ER. Rx is for 2000mg in the evening, but he has been dividing the dose, and thus missing most morning doses.  Blood sugar monitoring:  did not get to discuss today  Diet/Exercise: large volume of soda, as above     Eye exam is up to date  Foot exam: up to date  Urine Albumin: No results found for: \"UMALCR\"   Lab Results   Component Value Date    A1C 6.4 (H) 12/15/2023       Hyperlipidemia     atorvastatin 10mg daily  Patient reports no significant myalgias or other side effects.    Hypothyroidism    -levothyroxine 200mcg daily  Pt reported that he had been missing medication due to often forgetting morning dose, but has been trying to be more regular with it. Tries to take around 7am, but hasn't yet taken any meds today (9:30am)    Per PCP notes, \"We discussed taking 200 mcg daily except for Monday and Thursday when he will take 175 mcg.\"   Patient is having the following symptoms: hypothyroidism -  cold intolerance, myalgias, muscle cramps, fatigue, and constipation.      TSH   Date Value Ref Range Status   12/15/2023 8.45 (H) 0.30 - 4.20 uIU/mL Final   03/09/2013 4.48 0.4 - 5.0 mU/L Final       Restless legs:   -pregabalin titration (see below) currently 200mg at bedtime  -ropinirole 3mg at bedtime  -ferrous sulfate 325mg at bedtime    Patient stated that current meds are \"going pretty good.\"  States that sensation feels like 20-30% restlessness and the rest is sharp pain. He is usually able to fall asleep without issue. He has been staying asleep through the " night for the most part. Tries to be in bed by 12:30am, but sometimes later, and generally wakes up 6-7am, then back to sleep for a while. Feels that current regimen is working well and certainly improved since starting pregabalin.    Notes that he did get up to 300mg of pregabalin, but felt very hungover in the morning, so went back down.      - Increase Lyrica 50 mg weekly to goal dose of 300 mg nightly  Lyrica 50 mg                  am             Week 1 3             Week 2 4             Week 3 5             Week 4 6             - Okay to increase to ropinirole 3.5 mg nightly during this transition, ropinirole 0.5 mg provided.    ----------------      I spent 60 minutes with this patient today. A copy of the visit note was provided to the patient's provider(s).    A summary of these recommendations was sent via [employer].    Tiffany Mcdaniel, PharmD  Medication Therapy Management Pharmacist  [facility]      Telemedicine Visit Details  Type of service:  Telephone visit  Start Time:  9:00am  End Time:  10:00am     Medication Therapy Recommendations  Esophageal reflux    Current Medication: pantoprazole (PROTONIX) 40 MG EC tablet   Rationale: No medical indication at this time - Unnecessary medication therapy - Indication   Recommendation: Make Appt with PCP - consider dose reduction   Status: Patient Agreed - Adherence/Education         Type 2 diabetes mellitus without complication (H)    Current Medication: metFORMIN (GLUCOPHAGE XR) 500 MG 24 hr tablet   Rationale: Patient forgets to take - Adherence - Adherence   Recommendation: Provide Adherence Intervention - suggested taking all at bedtime   Status: Declined per Patient

## 2024-01-18 ENCOUNTER — VIRTUAL VISIT (OUTPATIENT)
Dept: NEUROLOGY | Facility: CLINIC | Age: 69
End: 2024-01-18
Attending: PSYCHIATRY & NEUROLOGY
Payer: COMMERCIAL

## 2024-01-18 DIAGNOSIS — E11.9 TYPE 2 DIABETES MELLITUS WITHOUT COMPLICATION, WITHOUT LONG-TERM CURRENT USE OF INSULIN (H): ICD-10-CM

## 2024-01-18 DIAGNOSIS — E03.9 HYPOTHYROIDISM, UNSPECIFIED TYPE: ICD-10-CM

## 2024-01-18 DIAGNOSIS — G25.81 RESTLESS LEG SYNDROME: Primary | ICD-10-CM

## 2024-01-18 DIAGNOSIS — K21.9 GASTROESOPHAGEAL REFLUX DISEASE WITHOUT ESOPHAGITIS: ICD-10-CM

## 2024-01-18 DIAGNOSIS — E78.5 HYPERLIPIDEMIA, UNSPECIFIED HYPERLIPIDEMIA TYPE: ICD-10-CM

## 2024-01-18 DIAGNOSIS — Z90.79 S/P ORCHIECTOMY: ICD-10-CM

## 2024-01-18 RX ORDER — METFORMIN HCL 500 MG
2000 TABLET, EXTENDED RELEASE 24 HR ORAL
COMMUNITY
Start: 2024-01-12

## 2024-01-18 RX ORDER — CEPHALEXIN 500 MG/1
500 CAPSULE ORAL 2 TIMES DAILY
COMMUNITY

## 2024-01-18 RX ORDER — LEVOTHYROXINE SODIUM 175 UG/1
175 TABLET ORAL DAILY
COMMUNITY

## 2024-01-18 NOTE — PROGRESS NOTES
Medication Therapy Management (MTM) Encounter    ASSESSMENT:                            Medication Adherence/Access: Encouraged him to consider what methods might work to help be motivated to take morning medications. Identified that he is overwhelmed by all the related pieces of his health and more consistent med dosing, paired with other lifestyle factors, would be a first step to feeling better.    RLS/Sleep:   Pregabalin continues to be quite effective and he might be feeling hungover in the morning with the combination with ropinirole.  Discussed reducing ropinirole from 3 mg to 2.5 mg nightly and taking it a couple hours before bedtime, followed by pregabalin dose right at bedtime.  Hopefully reducing and  ropinirole will allow for further pregabalin titration, and ultimately further reduced ropinirole. Patient agreed.  Will contact pharmacy to determine formulary issue with pregabalin.    Diabetes:    Again discussed that he could move metformin total dose to bedtime, though he prefers to work on morning adherence.  Patient to follow-up with primary care as scheduled.  Also again discussed efforts at reducing soda consumption to help improve blood sugar management.    Depression/Anxiety:   Discussed that duloxetine could be taken 1 time per day, though nighttime dosing may impact sleep for some people.  This patient has upcoming psychiatry appointment, encouraged him to discuss at that time.    Allergic Rhinitis:   Stable. Continue current medication.    Pain:    Stable. Continue current medication.    Supplements:   Stable. Continue current medication.    Constipation:  Stable. Continue current medication.      PLAN:                            -decrease ropinirole from 3mg to 2.5mg and take it a couple hours before bedtime  - Continue taking pregabalin right at bedtime.  Could consider increasing her titration as ropinirole dose is reduced    Follow-up: 3/14/24    SUBJECTIVE/OBJECTIVE:               "            Patrick Leal is a 68 year old male called for a follow-up visit from 1/18/24.       Reason for visit: med review; follow up from last week to finish reviewing meds    Allergies/ADRs: Reviewed in chart  Past Medical History: Reviewed in chart  Tobacco: He reports that he has never smoked. He has never used smokeless tobacco.  Alcohol: rarely  Caffeine: large fountain Pepsi daily (50-60oz), sometimes additional bottle     Medication Adherence/Access: forgets morning doses \"way too frequently\" plus tends to ignore reminders \"because I rebel at structure\". \"I'd like to think I take morning doses at least twice a week\"  PM meds are usually about 30 minutes before bedtime and bedtime is not consistent  -Uses AM and PM pill boxes, but struggling with being able to separate levothyroxine and remember second set of morning doses    Restless legs/Sleep:   -pregabalin titration (see below) currently 200mg at bedtime--started on 1/4  -ropinirole 3mg at bedtime  -ferrous sulfate 325mg at bedtime  -trazodone 200mg nightly    At last visit, patient had reported getting up to 300 mg of pregabalin, but felt very hung over in the morning, so went back down.  He had felt as though current dosing is working pretty well.  Per that note, \"States that sensation feels like 20-30% restlessness and the rest is sharp pain. He is usually able to fall asleep without issue. He has been staying asleep through the night for the most part. Tries to be in bed by 12:30am, but sometimes later, and generally wakes up 6-7am, then back to sleep for a while. Feels that current regimen is working well and certainly improved since starting pregabalin.\"    Today, he reported that he might be feeling some leg pain later in the evening, but feels much better within about 30 min of taking medication. He has been trying to work on a more consistent sleep schedule, but has been taking meds around 12:30am. He has been falling asleep pretty well after " "meds and sleeping for about 6 hours.  He is feeling a bit \"hungover\" in the morning, sometimes not feeling more energy until 10-11am or \"depends on how much caffeine I drink.\" He is trying to reduce caffeine intake, which has been difficult.    Stated that he received notice that pregabalin \"may have limitations\" though unclear whether it is a quantity limit or non formulary.     Diabetes -  metformin ER 1000mg BID (usually forgets morning dose)  -Ozempic 0.5mg daily  Aspirin 81mg daily  Previously significant nausea with IR metformin    At last visit, discussed moving metformin to all at bedtime since he was forgetting morning dose most of the time. Today states that he would prefer to figure out how to remember morning doses  Blood sugar monitorin-1 time(s) daily; Ranges: (patient reported) Fasting- 120s   Current diabetes symptoms: none  Diet/Exercise: 60oz+ soda per day; minimal exercise     Eye exam is up to date  Foot exam: up to date  Urine Albumin: No results found for: \"UMALCR\"   Lab Results   Component Value Date    A1C 6.4 (H) 12/15/2023     Depression/Anxiety:   -duloxetine 60mg BID (usually takes only evening dose)  -lorazepam 0.5mg as needed for panic attacks (infrequent)    Patient reported that he had taken 120mg daily, but psychiatrist recommended that he take 60mg BID. As above, he has a lot of difficulty remembering the morning doses (almost never).  Has been feeling a bit more down lately, feeling that \"all the spinning plates are going to come crashing down.\"   Last 30 day refill was almost 2 months late, which seems to have been a continued pattern, per dispense report.  Has psychiatry follow up in a few weeks.    Allergic Rhinitis:   Flonase (fluticasone) nasal spray - 1 spray(s) each nostril daily prn (infrequently)  Patient reports no current medication side effects.     Patient feels that current therapy is effective.    Pain:    -lidocaine patch daily prn (infrequent)  -diclofenac gel " prn     Both are helpful for restless leg pain when needed. Doesn't work very quickly, but does help reduce intensity.    Supplements:   -Vitamin D 2000units every morning  -chromium 200mcg every morning  -probiotic every morning  -B12 1000mcg morning  -cinnamon 1000mg BID  -Vitamin E 400 units every morning  -ferrous sulfate 325mg QHS  No reported issues at this time.      Constipation:  -senna/docusate daily prn  -loperamide as needed    BM frequency: every 1-2 days; sometimes diarrhea  Straining: No    ----------------    I spent 40 minutes with this patient today. All changes were made via collaborative practice agreement with [facility]. A copy of the visit note was provided to the patient's provider(s).    A summary of these recommendations was sent via clinic portal.    Cristina MaeysD  Medication Therapy Management Pharmacist  [facility]    Telemedicine Visit Details  Type of service:  Telephone visit  Start Time:  2:30pm  End Time:  3:10pm     Medication Therapy Recommendations  Restless leg syndrome    Current Medication: rOPINIRole (REQUIP) 1 MG tablet   Rationale: Undesirable effect - Adverse medication event - Safety   Recommendation: Decrease Dose - decrease from 3mg to 2.5mg nightly   Status: Accepted per CPA

## 2024-01-18 NOTE — Clinical Note
1/18/2024       RE: Patrick Leal  [address]  [facility] [place] [address]     Dear Colleague,    Thank you for referring your patient, Patrick Leal, to the [facility] [place] at [facility]. Please see a copy of my visit note below.    Medication Therapy Management (MTM) Encounter    ASSESSMENT:                            Medication Adherence/Access: No issues identified    GERD:   Might consider a dose reduction to determine if medication remains necessary.  Chronic PPI use places patient at an increased risk of C. Diff, hypomagnesemia and lower bone mineral density, these risks were reviewed with the patient.     S/p orchiectomy:  There is a liquid calcium supplement available by various manufacturers. Information sent.    Diabetes   Discussed that he could move all the metformin to the evening in effort to improve adherence, which he will consider. Discussed reducing his soda, and thereby his sugar and caffeine, consumption. He set goal to start by reducing/cutting the afternoon soda. He would like to continue trying to take morning meds and will consider moving full metformin dose to the evening.    Hyperlipidemia   Stable. Continue current medication.    Hypothyroidism    Many of his current symptoms could be related to elevated TSH. Discussed various methods by which to improve medication adherence. He is missing multiple medications in effort to separate levothyroxine and then forgetting the rest of the medications. May consider consolidating the morning medications and adjusting the levothyroxine dose accordingly, if it seems to have altered absorption.   Encouraged him to continue efforts at  for now and discuss with PCP. He will also begin taking the lower 175mcg on Mondays and Thursdays, as prescribed.    Restless legs:   Discussed trying to further increase the pregabalin and move it to earlier  "in the evening in order to avoid hungover feeling in the morning, which may allow ropinirole reduction. Patient is currently working on simplifying med regimen by reducing times he needs to take medication during the day, so adding another dosing time in the evening for pregabalin right now is not likely to be remembered. Also discussed potential impact that caffeine has on restless legs, as his soda consumption seems quite high. Mutually set goal to start reducing the afternoon soda. Will continue with current regimen and he will consider this plan.    PLAN:                            -Start reducing afternoon soda, with goal of cutting it completely  -Discussed moving the full metformin dose to the evening, but you did not want to make this change today  -Liquid calcium supplement is available called Nature's Blend Liquid Calcium Citrate found on www[employer]  -There is also a powder to be mixed with water, called NOW calcium citrate powder. You can find it on www.[employer]   Talk with your primary care doctor about:  -consider reducing pantoprazole to determine if medication is still needed  -consider taking levothyroxine with your other morning meds to help with remembering to take them every day.     Follow-up: Thursday, 1/25/24 2:30pm    SUBJECTIVE/OBJECTIVE:                          Patrick Leal is a 68 year old male called for an initial visit. He was referred to me from Dr. Mills.    Patient has met with MTM through [facility].    Reason for visit: med review.    Allergies/ADRs: Reviewed in chart; Juan Luis Cheema 2015  Past Medical History: Reviewed in chart  Tobacco: He reports that he has never smoked. He has never used smokeless tobacco.  Alcohol: rarely  Caffeine: large fountain Pepsi daily (50-60oz), sometimes additional bottle    Medication Adherence/Access: forgets morning doses \"way too frequently\" plus tends to ignore reminders \"because I rebel at structure\". \"I'd like to think I take " "morning doses at least twice a week\"  PM meds are usually about 30 minutes before bedtime and bedtime is not consistent  -Uses AM and PM pill boxes, but struggling with being able to separate levothyroxine and remember second set of morning doses    GERD:   Pantoprazole 40mg once daily   Patient feels that current regimen is effective, has helped with belching. Has been on for longer than 10 years.     S/p orchiectomy:  -estradiol 0.025mg patch twice weekly  -Vitamin D 2000 units    States that he has difficulty swallowing the large calcium supplements and cannot do chewable due to issues with his dentures.      Diabetes  -metformin ER 1000mg BID (usually forgets morning dose)  Aspirin 81mg daily  Previously significant GI issues with IR metformin, but have improved with using ER. Rx is for 2000mg in the evening, but he has been dividing the dose, and thus missing most morning doses.  Blood sugar monitoring:  did not get to discuss today  Diet/Exercise: large volume of soda, as above     Eye exam is up to date  Foot exam: up to date  Urine Albumin: No results found for: \"UMALCR\"   Lab Results   Component Value Date    A1C 6.4 (H) 12/15/2023       Hyperlipidemia    atorvastatin 10mg daily  Patient reports no significant myalgias or other side effects.    Hypothyroidism   -levothyroxine 200mcg daily  Pt reported that he had been missing medication due to often forgetting morning dose, but has been trying to be more regular with it. Tries to take around 7am, but hasn't yet taken any meds today (9:30am)    Per PCP notes, \"We discussed taking 200 mcg daily except for Monday and Thursday when he will take 175 mcg.\"   Patient is having the following symptoms: hypothyroidism -  cold intolerance, myalgias, muscle cramps, fatigue, and constipation.      TSH   Date Value Ref Range Status   12/15/2023 8.45 (H) 0.30 - 4.20 uIU/mL Final   03/09/2013 4.48 0.4 - 5.0 mU/L Final       Restless legs:   -pregabalin titration (see " "below) currently 200mg at bedtime  -ropinirole 3mg at bedtime  -ferrous sulfate 325mg at bedtime    Patient stated that current meds are \"going pretty good.\"  States that sensation feels like 20-30% restlessness and the rest is sharp pain. He is usually able to fall asleep without issue. He has been staying asleep through the night for the most part. Tries to be in bed by 12:30am, but sometimes later, and generally wakes up 6-7am, then back to sleep for a while. Feels that current regimen is working well and certainly improved since starting pregabalin.    Notes that he did get up to 300mg of pregabalin, but felt very hungover in the morning, so went back down.      - Increase Lyrica 50 mg weekly to goal dose of 300 mg nightly  Lyrica 50 mg                  am             Week 1 3             Week 2 4             Week 3 5             Week 4 6             - Okay to increase to ropinirole 3.5 mg nightly during this transition, ropinirole 0.5 mg provided.    ----------------      I spent 60 minutes with this patient today. { :[id number]}. A copy of the visit note was provided to the patient's provider(s).    A summary of these recommendations {GIVEN/NOT GIVEN:102996}.    ***    Telemedicine Visit Details  Type of service:  {[facility]264249::\"Telephone visit\"}  Start Time: {video/phone visit start time:152948}  End Time: {video/phone visit end time:152948}     Medication Therapy Recommendations  No medication therapy recommendations to display       Again, thank you for allowing me to participate in the care of your patient.      Sincerely,    Tiffany Mcdaniel, PharmD    "

## 2024-01-18 NOTE — PATIENT INSTRUCTIONS
"Recommendations from today's MTM visit:                                                    MTM (medication therapy management) is a service provided by a clinical pharmacist designed to help you get the most of out of your medicines.   Today we reviewed what your medicines are for, how to know if they are working, that your medicines are safe and how to make your medicine regimen as easy as possible.      -Start reducing afternoon soda, with goal of cutting it completely  -Discussed moving the full metformin dose to the evening, but you did not want to make this change today  -Liquid calcium supplement is available called Nature's Blend Liquid Calcium Citrate found on www.[employer]  -There is also a powder to be mixed with water, called NOW calcium citrate powder. You can find it on www.[employer]     Talk with your primary care doctor about:  -consider reducing pantoprazole to determine if medication is still needed  -consider taking levothyroxine with your other morning meds to help with remembering to take them every day.      Follow-up: Thursday, 1/25/24 2:30pm    It was great speaking with you today.  I value your experience and would be very thankful for your time in providing feedback in our clinic survey. In the next few days, you may receive an email or text message from [employer] with a link to a survey related to your  clinical pharmacist.\"     To schedule another MTM appointment, please call the clinic directly or you may call the MTM scheduling line at [phone].    My Clinical Pharmacist's contact information:                                                      Please feel free to contact me with any questions or concerns you have.      Tiffany Mcdaniel, Roberto  Medication Therapy Management Pharmacist  [facility]      "

## 2024-01-25 ENCOUNTER — VIRTUAL VISIT (OUTPATIENT)
Dept: NEUROLOGY | Facility: CLINIC | Age: 69
End: 2024-01-25
Attending: PSYCHIATRY & NEUROLOGY
Payer: COMMERCIAL

## 2024-01-25 DIAGNOSIS — G25.81 RESTLESS LEG SYNDROME: Primary | ICD-10-CM

## 2024-01-25 DIAGNOSIS — F41.1 GAD (GENERALIZED ANXIETY DISORDER): ICD-10-CM

## 2024-01-25 DIAGNOSIS — F33.0 MILD EPISODE OF RECURRENT MAJOR DEPRESSIVE DISORDER (H): ICD-10-CM

## 2024-01-25 DIAGNOSIS — E11.9 TYPE 2 DIABETES MELLITUS WITHOUT COMPLICATION, WITHOUT LONG-TERM CURRENT USE OF INSULIN (H): ICD-10-CM

## 2024-01-25 DIAGNOSIS — G47.00 INSOMNIA, UNSPECIFIED TYPE: ICD-10-CM

## 2024-01-25 DIAGNOSIS — Z78.9 TAKES DIETARY SUPPLEMENTS: ICD-10-CM

## 2024-01-25 DIAGNOSIS — J30.89 ALLERGIC RHINITIS DUE TO OTHER ALLERGIC TRIGGER, UNSPECIFIED SEASONALITY: ICD-10-CM

## 2024-01-25 DIAGNOSIS — R52 PAIN: ICD-10-CM

## 2024-01-25 DIAGNOSIS — F41.1 GENERALIZED ANXIETY DISORDER: ICD-10-CM

## 2024-01-25 DIAGNOSIS — K59.00 CONSTIPATION, UNSPECIFIED CONSTIPATION TYPE: ICD-10-CM

## 2024-01-25 RX ORDER — DULOXETIN HYDROCHLORIDE 60 MG/1
60 CAPSULE, DELAYED RELEASE ORAL 2 TIMES DAILY
COMMUNITY

## 2024-01-25 RX ORDER — LOPERAMIDE HCL 2 MG
2 CAPSULE ORAL 4 TIMES DAILY PRN
COMMUNITY

## 2024-01-25 RX ORDER — LANOLIN ALCOHOL/MO/W.PET/CERES
1000 CREAM (GRAM) TOPICAL DAILY
COMMUNITY

## 2024-01-25 NOTE — Clinical Note
"1/25/2024       RE: Patrick Leal  [address]  [facility] [address]     Dear Colleague,    Thank you for referring your patient, Patrick Leal, to the [facility] [place] at [facility]. Please see a copy of my visit note below.    Medication Therapy Management (MTM) Encounter    ASSESSMENT:                            Medication Adherence/Access: {adherencechoices:521888}    ***:   ***    PLAN:                            ***    Follow-up: {followuptest2:620496}    SUBJECTIVE/OBJECTIVE:                          Patrick Leal is a 68 year old male called for a follow-up visit from 1/18/24. {mtmvisitdetails:061096}      Reason for visit: ***.    Allergies/ADRs: {1/2/3/4/5:723104}  Past Medical History: {1/2/3/4/5:291467}  Tobacco: He reports that he has never smoked. He has never used smokeless tobacco.  Alcohol: {ALCOHOL CONSUMPTION HX:103464}  {Social and Goals:131662}    Medication Adherence/Access: forgets morning doses \"way too frequently\" plus tends to ignore reminders \"because I rebel at structure\". \"I'd like to think I take morning doses at least twice a week\"  PM meds are usually about 30 minutes before bedtime and bedtime is not consistent  -Uses AM and PM pill boxes, but struggling with being able to separate levothyroxine and remember second set of morning doses    Restless legs:   -pregabalin titration (see below) currently 200mg at bedtime--started on 1/4  -ropinirole 3mg at bedtime  -ferrous sulfate 325mg at bedtime    At last visit, patient had reported getting up to 300 mg of pregabalin, but felt very hung over in the morning, so went back down.  He had felt as though current dosing is working pretty well.  Per that note, \"States that sensation feels like 20-30% restlessness and the rest is sharp pain. He is usually able to fall asleep without issue. He has been staying asleep through the night " "for the most part. Tries to be in bed by 12:30am, but sometimes later, and generally wakes up 6-7am, then back to sleep for a while. Feels that current regimen is working well and certainly improved since starting pregabalin.\"          - Increase Lyrica 50 mg weekly to goal dose of 300 mg nightly  Lyrica 50 mg                  am             Week 1 3             Week 2 4             Week 3 5             Week 4 6             - Okay to increase to ropinirole 3.5 mg nightly during this transition, ropinirole 0.5 mg provided.      Diabetes-  metformin ER 1000mg BID (usually forgets morning dose)  -Ozempic    Aspirin 81mg daily  Previously significant nausea with IR metformin    At last visit, discussed moving metformin to all at bedtime since he was forgetting morning dose most of the time  Blood sugar monitoring: {MT self monitorin}  Current diabetes symptoms: {Diabetes Symptoms:027508}  Diet/Exercise: ***       Eye exam is up to date  Foot exam: {up to date:583006}  Urine Albumin: No results found for: \"UMALCR\"   Lab Results   Component Value Date    A1C 6.4 (H) 12/15/2023           Depression/Anxiety:   -duloxetine 120mg daily     Last 30 day refill was almost 2 months late, which seems to have been a continued pattern, per dispense report.    Asthma:   {Maintenance inhalers:445980}  {Rescue inhalers:324300:p}  {Non-inhaler options:226394:p}  {Steroid inhaler -- Delete if patient does not use steroid:358598}   Patient reports {:908921}.      Triggers include: {ASTHMA TRIGGERS:122329}.  Patient reports the following symptoms: {SYMPTOMS:558599}.      Allergic Rhinitis:   {meds:931901:p}  Patient reports {:867172}.    {allergy:585669}   Patient feels that current therapy {IS NOT/IS:547297} effective.     Pain:    ***     Supplements:   ***  {supplement:301393}     Constipation:  ***    BM frequency: ***  Straining: {YES/NO:54836}  Water intake: ***    Insomnia:    ***          Today's Vitals: There were " "no vitals taken for this visit.  ----------------  {JEANINE?:221143}    I spent {mtm total time 3:134180} with this patient today{MTMpartdbillingquestion:628078}. { :440554}. A copy of the visit note was provided to the patient's provider(s).    A summary of these recommendations {GIVEN/NOT GIVEN:295555}.    ***    Telemedicine Visit Details  Type of service:  {telemedvisitmtm:129720::\"Telephone visit\"}  Start Time: {video/phone visit start time:152948}  End Time: {video/phone visit end time:152948}     Medication Therapy Recommendations  No medication therapy recommendations to display     Medication Therapy Management (MTM) Encounter    ASSESSMENT:                            Medication Adherence/Access: Encouraged him to consider what methods might work to help be motivated to take morning medications. Identified that he is overwhelmed by all the related pieces of his health and more consistent med dosing, paired with other lifestyle factors, would be a first step to feeling better.    RLS/Sleep:   Pregabalin continues to be quite effective and he might be feeling hungover in the morning with the combination with ropinirole.  Discussed reducing ropinirole from 3 mg to 2.5 mg nightly and taking it a couple hours before bedtime, followed by pregabalin dose right at bedtime.  Patient agreed.  Will contact pharmacy to determine formulary issue with pregabalin.    Diabetes:    Again discussed that he could move metformin total dose to bedtime, though he prefers to work on morning adherence.  Patient to follow-up with primary care as scheduled.  Also again discussed efforts at reducing soda consumption to help improve blood sugar management.    Depression/Anxiety:   Discussed that duloxetine could be taken 1 time per day, though nighttime dosing may impact sleep for some people.  This patient has upcoming psychiatry appointment, encouraged him to discuss at that time.    -duloxetine 60mg BID (usually takes only evening " "dose)  -lorazepam 0.5mg as needed for panic attacks (infrequent)    Patient reported that he had taken 120mg daily, but psychiatrist recommended that he take 60mg BID. As above, he has a lot of difficulty remembering the morning doses (almost never).  Has been feeling a bit more down lately, feeling that \"all the spinning plates are going to come crashing down.\"   Last 30 day refill was almost 2 months late, which seems to have been a continued pattern, per dispense report.  Has psychiatry follow up in a few weeks.    Allergic Rhinitis:   Flonase (fluticasone) nasal spray - 1 spray(s) each nostril daily prn (infrequently)  Patient reports no current medication side effects.     Patient feels that current therapy is effective.    Pain:    -lidocaine patch daily prn (infrequent)  -diclofenac gel prn     Both are helpful for restless leg pain when needed. Doesn't work very quickly, but does help reduce intensity.    Supplements:   -Vitamin D 2000units every morning  -chromium 200mcg every morning  -probiotic every morning  -B12 1000mcg morning  -cinnamon 1000mg BID  -Vitamin E 400 units every morning  -ferrous sulfate 325mg QHS  No reported issues at this time.      Constipation:  -senna/docusate daily prn  -loperamide as needed      PLAN:                            -decrease ropinirole from 3mg to 2.5mg  -try taking earlier and divide it up    -talk with psychiatry about consolidating duloxetine    Follow-up: {followuptest2:711342}    SUBJECTIVE/OBJECTIVE:                          Patrick Leal is a 68 year old male called for a follow-up visit from 1/18/24.       Reason for visit: med review; follow up from last week to finish reviewing meds    Allergies/ADRs: Reviewed in chart  Past Medical History: Reviewed in chart  Tobacco: He reports that he has never smoked. He has never used smokeless tobacco.  Alcohol: rarely  Caffeine: large fountain Pepsi daily (50-60oz), sometimes additional bottle     Medication " "Adherence/Access: forgets morning doses \"way too frequently\" plus tends to ignore reminders \"because I rebel at structure\". \"I'd like to think I take morning doses at least twice a week\"  PM meds are usually about 30 minutes before bedtime and bedtime is not consistent  -Uses AM and PM pill boxes, but struggling with being able to separate levothyroxine and remember second set of morning doses    Restless legs/Sleep:   -pregabalin titration (see below) currently 200mg at bedtime--started on 1/4  -ropinirole 3mg at bedtime  -ferrous sulfate 325mg at bedtime  -trazodone 200mg nightly    At last visit, patient had reported getting up to 300 mg of pregabalin, but felt very hung over in the morning, so went back down.  He had felt as though current dosing is working pretty well.  Per that note, \"States that sensation feels like 20-30% restlessness and the rest is sharp pain. He is usually able to fall asleep without issue. He has been staying asleep through the night for the most part. Tries to be in bed by 12:30am, but sometimes later, and generally wakes up 6-7am, then back to sleep for a while. Feels that current regimen is working well and certainly improved since starting pregabalin.\"    Today, he reported that he might be feeling some leg pain later in the evening, but feels much better within about 30 min of taking medication. He has been trying to work on a more consistent sleep schedule, but has been taking meds around 12:30am. He has been falling asleep pretty well after meds and sleeping for about 6 hours.  He is feeling a bit \"hungover\" in the morning, sometimes not feeling more energy until 10-11am or \"depends on how much caffeine I drink.\" He is trying to reduce caffeine intake, which has been difficult.    Stated that he received notice that pregabalin \"may have limitations\" though unclear whether it is a quantity limit or non formulary.     Diabetes-  metformin ER 1000mg BID (usually forgets morning " "dose)  -Ozempic 0.5mg daily  Aspirin 81mg daily  Previously significant nausea with IR metformin    At last visit, discussed moving metformin to all at bedtime since he was forgetting morning dose most of the time. Today states that he would prefer to figure out how to remember morning doses  Blood sugar monitorin-1 time(s) daily; Ranges: (patient reported) Fasting- 120s   Current diabetes symptoms: none  Diet/Exercise: 60oz+ soda per day; minimal exercise     Eye exam is up to date  Foot exam: up to date  Urine Albumin: No results found for: \"UMALCR\"   Lab Results   Component Value Date    A1C 6.4 (H) 12/15/2023     Depression/Anxiety:   -duloxetine 60mg BID (usually takes only evening dose)  -lorazepam 0.5mg as needed for panic attacks (infrequent)    Patient reported that he had taken 120mg daily, but psychiatrist recommended that he take 60mg BID. As above, he has a lot of difficulty remembering the morning doses (almost never).  Has been feeling a bit more down lately, feeling that \"all the spinning plates are going to come crashing down.\"   Last 30 day refill was almost 2 months late, which seems to have been a continued pattern, per dispense report.  Has psychiatry follow up in a few weeks.    Allergic Rhinitis:   Flonase (fluticasone) nasal spray - 1 spray(s) each nostril daily prn (infrequently)  Patient reports no current medication side effects.     Patient feels that current therapy is effective.    Pain:    -lidocaine patch daily prn (infrequent)  -diclofenac gel prn     Both are helpful for restless leg pain when needed. Doesn't work very quickly, but does help reduce intensity.    Supplements:   -Vitamin D 2000units every morning  -chromium 200mcg every morning  -probiotic every morning  -B12 1000mcg morning  -cinnamon 1000mg BID  -Vitamin E 400 units every morning  -ferrous sulfate 325mg QHS  No reported issues at this time.      Constipation:  -senna/docusate daily prn  -loperamide as " "needed    BM frequency: every 1-2 days; sometimes diarrhea  Straining: No    Today's Vitals: There were no vitals taken for this visit.  ----------------      I spent {[facility] total time 3:150088} with this patient today{MTMpartdbillingquestion:756941}. { :509850}. A copy of the visit note was provided to the patient's provider(s).    A summary of these recommendations {GIVEN/NOT GIVEN:123781}.    ***    Telemedicine Visit Details  Type of service:  {[facility]:511692::\"Telephone visit\"}  Start Time: {video/phone visit start time:152948}  End Time: {video/phone visit end time:152948}     Medication Therapy Recommendations  No medication therapy recommendations to display       Again, thank you for allowing me to participate in the care of your patient.      Sincerely,    Tiffany Mcdaniel, PharmD    "

## 2024-01-25 NOTE — Clinical Note
"Hello- Patient reported getting a letter that pregabalin \"might have limitations\" per insurance coverage.  He read a part of it to me today, but I could not tell if it is nonformulary or a quantity limit.  He is currently titrating the dose so he may just need to change the capsule strength.  Could you possibly please call the pharmacy to clarify? Thank you so much! Tiffany"

## 2024-01-25 NOTE — Clinical Note
Reducing ropinirole from 3 mg to 2.5 mg nightly.  Also encouraged him to take a couple hours before bedtime and keep pregabalin right at bedtime.  Will continue trying to titrate pregabalin, although has been going well thus far.  There are also a lot of lifestyle modifications that would also likely be helpful and I'll continue to work on with him.

## 2024-01-25 NOTE — PATIENT INSTRUCTIONS
"Recommendations from today's MTM visit:                                                    MTM (medication therapy management) is a service provided by a clinical pharmacist designed to help you get the most of out of your medicines.   Today we reviewed what your medicines are for, how to know if they are working, that your medicines are safe and how to make your medicine regimen as easy as possible.      -decrease ropinirole from 3mg to 2.5mg and take it a couple hours before bedtime  - Continue taking pregabalin right at bedtime.  Could consider increasing her titration as ropinirole dose is reduced    Follow-up: 3/14/24    It was great speaking with you today.  I value your experience and would be very thankful for your time in providing feedback in our clinic survey. In the next few days, you may receive an email or text message from [employer] with a link to a survey related to your  clinical pharmacist.\"     To schedule another MTM appointment, please call the clinic directly or you may call the MTM scheduling line at [phone] or toll-free at [phone].     My Clinical Pharmacist's contact information:                                                      Please feel free to contact me with any questions or concerns you have.      Tiffany Mcdaniel, PharmD  Medication Therapy Management Pharmacist  [facility]    "

## 2024-01-26 ENCOUNTER — TELEPHONE (OUTPATIENT)
Dept: NEUROLOGY | Facility: CLINIC | Age: 69
End: 2024-01-26
Payer: COMMERCIAL

## 2024-01-26 NOTE — TELEPHONE ENCOUNTER
Spoke to Alix, pharmacist. He picked up the prescription on 1/5. She cannot process the prescription until 2/1 since it is too soon to refill. She noted they filled it earlier this month with no issues.

## 2024-02-01 DIAGNOSIS — G25.81 RESTLESS LEG SYNDROME: Primary | ICD-10-CM

## 2024-02-01 NOTE — TELEPHONE ENCOUNTER
Covering for Tiffany Mcdaniel, PharmD. Pended an order for Lyrica 100 mg capsules and sent to Dr. Mills to sign.    Yanira Glasgow PharmSamuelD.  Medication Therapy Management Pharmacist  [facility]

## 2024-02-01 NOTE — TELEPHONE ENCOUNTER
Alix, pharmacist ran the pregabalin 50 mg capsules. She is getting a rejected maximum dose of 3 capsules per day. They were able to fill last time because it was a transition dose. She states typically the max applies to any strength so we could try prescribing 300 mg capsules instead or submit a quantity exception PA.

## 2024-02-01 NOTE — TELEPHONE ENCOUNTER
Covering for Tiffany Mcdaniel, PharmD. Pharmacy unable to fill prescription for 50 mg Lyrica capsules due to quantity limits per insurance plan. Recommend switching to 100 mg capsules as patient is currently taking 200 mg daily.    Cristina Alonso.D.  Medication Therapy Management Pharmacist  [facility]

## 2024-02-02 RX ORDER — PREGABALIN 100 MG/1
200 CAPSULE ORAL EVERY EVENING
Qty: 60 CAPSULE | Refills: 5 | Status: SHIPPED | OUTPATIENT
Start: 2024-02-02 | End: 2024-06-12

## 2024-03-14 ENCOUNTER — VIRTUAL VISIT (OUTPATIENT)
Dept: NEUROLOGY | Facility: CLINIC | Age: 69
End: 2024-03-14
Attending: PSYCHIATRY & NEUROLOGY
Payer: COMMERCIAL

## 2024-03-14 DIAGNOSIS — G25.81 RESTLESS LEG SYNDROME: Primary | ICD-10-CM

## 2024-03-14 DIAGNOSIS — G47.00 INSOMNIA, UNSPECIFIED TYPE: ICD-10-CM

## 2024-03-14 NOTE — PROGRESS NOTES
"Medication Therapy Management (MTM) Encounter    ASSESSMENT:                            Medication Adherence/Access: No issues identified    Restless legs/sleep:   Doing well with current regimen. Pt will plan to reduce ropinirole from 3mg to 2.5mg for the next few weeks until neurology follow up, then may consider pregabalin increase at that time, depending on outcome.    PLAN:                            Reduce ropinirole from 3mg to 2.5mg per previous plan    Follow-up: 5/3/24    SUBJECTIVE/OBJECTIVE:                          Patrick Leal is a 68 year old male called for a follow-up visit.       Reason for visit: med check.    Allergies/ADRs: Reviewed in chart  Past Medical History: Reviewed in chart  Tobacco: He reports that he has never smoked. He has never used smokeless tobacco.  Alcohol: rarely  Caffeine:  large fountain Pepsi daily (50-60oz), sometimes additional bottle    Medication Adherence/Access: no issues reported    Medication Adherence/Access: forgets morning doses \"way too frequently\" plus tends to ignore reminders \"because I rebel at structure\". \"I'd like to think I take morning doses at least twice a week\"  PM meds are usually about 30 minutes before bedtime and bedtime is not consistent  -Uses AM and PM pill boxes, but struggling with being able to separate levothyroxine and remember second set of morning doses     Restless legs/Sleep:   -pregabalin 200mg at bedtime  -ropinirole 3mg at bedtime  -ferrous sulfate 325mg at bedtime  -trazodone 200mg nightly     Pt had started pregabalin due to ropinirole causing quite a bit of sedation. Pregabalin has been quite helpful thus far and has been slowly cross titrating.  At last visit, pregabalin was continued at 200mg at bedtime and ropinirole reduced from 3mg to 2.5mg at bedtime.    Today, he reported that he hasn't yet reduced the ropinirole dose. His mother passed away on 2/28, so has been quite preoccupied.  He stays up until he's quite tired to " avoid restless legs if he's not quite tired enough. He then takes bedtime meds and falls asleep within 30 minutes without any leg issue. He generally stays asleep through the night unless needs to take care of the dog. Morning hangover feeling is a bit better, though still feels groggy for a while.  Leg pain and restlessness is feeling quite stable with this regimen.    ----------------    I spent 10 minutes with this patient today. All changes were made via collaborative practice agreement with [facility]. A copy of the visit note was provided to the patient's provider(s).    A summary of these recommendations was sent via clinic portal.    Tiffany Mcdaniel, PharmD  Medication Therapy Management Pharmacist  [facility] [place] [facility]      Telemedicine Visit Details  Type of service:  Telephone visit  Start Time:  9:00am  End Time:  9:10am     Medication Therapy Recommendations  Restless leg syndrome    Current Medication: rOPINIRole (REQUIP) 0.5 MG tablet   Rationale: Patient forgets to take - Adherence - Adherence   Recommendation: Provide Education - reduce from 3mg to 2.5mg at bedtime   Status: Patient Agreed - Adherence/Education

## 2024-03-14 NOTE — Clinical Note
"3/14/2024       RE: Patrick Leal  [address]  [facility] [address]     Dear Colleague,    Thank you for referring your patient, Patrick Leal, to the [facility] [place] at [facility]. Please see a copy of my visit note below.    Medication Therapy Management (MTM) Encounter    ASSESSMENT:                            Medication Adherence/Access: {adherencechoices:146704}    ***:   ***    PLAN:                            ***    Follow-up: {followuptest2:014535}    SUBJECTIVE/OBJECTIVE:                          Patrick Leal is a 68 year old male { :623368} for {mtmvisit:886665}     Reason for visit: ***.    Allergies/ADRs: {1/2/3/4/5:478546}  Past Medical History: {1/2/3/4/5:580938}  Tobacco: He reports that he has never smoked. He has never used smokeless tobacco.  Alcohol: {ALCOHOL CONSUMPTION HX:254680}  {Social and Goals:000738}  Medication Adherence/Access: {fumedadherence:188674}    {MTM SUBJECTIVE (Optional):921823}    -decrease ropinirole from 3mg to 2.5mg and take it a couple hours before bedtime  - Continue taking pregabalin right at bedtime.  Could consider increasing her titration as ropinirole dose is reduced     Medication Adherence/Access: forgets morning doses \"way too frequently\" plus tends to ignore reminders \"because I rebel at structure\". \"I'd like to think I take morning doses at least twice a week\"  PM meds are usually about 30 minutes before bedtime and bedtime is not consistent  -Uses AM and PM pill boxes, but struggling with being able to separate levothyroxine and remember second set of morning doses     Restless legs/Sleep:   -pregabalin titration (see below) currently 200mg at bedtime--started on 1/4  -ropinirole 3mg at bedtime  -ferrous sulfate 325mg at bedtime  -trazodone 200mg nightly     Pt had started pregabalin due to ropinirole causing quite a bit of sedation. Pregabalin has " "been quite helpful thus far and has been slowly cross titrating.  At last visit, pregabalin was continued at 200mg at bedtime and ropinirole reduced from 3mg to 2.5mg at bedtime.    Today, he reported ***          At last visit, patient had reported getting up to 300 mg of pregabalin, but felt very hung over in the morning, so went back down.  He had felt as though current dosing is working pretty well.  Per that note, \"States that sensation feels like 20-30% restlessness and the rest is sharp pain. He is usually able to fall asleep without issue. He has been staying asleep through the night for the most part. Tries to be in bed by 12:30am, but sometimes later, and generally wakes up 6-7am, then back to sleep for a while. Feels that current regimen is working well and certainly improved since starting pregabalin.\"     Today, he reported that he might be feeling some leg pain later in the evening, but feels much better within about 30 min of taking medication. He has been trying to work on a more consistent sleep schedule, but has been taking meds around 12:30am. He has been falling asleep pretty well after meds and sleeping for about 6 hours.  He is feeling a bit \"hungover\" in the morning, sometimes not feeling more energy until 10-11am or \"depends on how much caffeine I drink.\" He is trying to reduce caffeine intake, which has been difficult.     Stated that he received notice that pregabalin \"may have limitations\" though unclear whether it is a quantity limit or non formulary.    ***:   ***    Today's Vitals: There were no vitals taken for this visit.  ----------------  {JEANINE?:381819}    I spent {[facility] total time 3:428017} with this patient today{MTMpartdbillingquestion:665977}. { :542754}. A copy of the visit note was provided to the patient's provider(s).    A summary of these recommendations {GIVEN/NOT GIVEN:529765}.    ***    Telemedicine Visit Details  Type of service:  {[facility]visit[facility]:757309::\"Telephone " "visit\"}  Start Time: {video/phone visit start time:152948}  End Time: {video/phone visit end time:152948}     Medication Therapy Recommendations  No medication therapy recommendations to display     Medication Therapy Management (MTM) Encounter    ASSESSMENT:                            Medication Adherence/Access: No issues identified    Restless legs/sleep:   Doing well with current regimen. Pt will plan to reduce ropinirole from 3mg to 2.5mg for the next few weeks until neurology follow up, then may consider pregabalin increase at that time, depending on outcome.    PLAN:                            Reduce ropinirole from 3mg to 2.5mg per previous plan    Follow-up: 5/3/24    SUBJECTIVE/OBJECTIVE:                          Patrick Leal is a 68 year old male called for a follow-up visit.       Reason for visit: med check.    Allergies/ADRs: Reviewed in chart  Past Medical History: Reviewed in chart  Tobacco: He reports that he has never smoked. He has never used smokeless tobacco.  Alcohol: rarely  Caffeine:  large fountain Pepsi daily (50-60oz), sometimes additional bottle    Medication Adherence/Access: no issues reported    Medication Adherence/Access: forgets morning doses \"way too frequently\" plus tends to ignore reminders \"because I rebel at structure\". \"I'd like to think I take morning doses at least twice a week\"  PM meds are usually about 30 minutes before bedtime and bedtime is not consistent  -Uses AM and PM pill boxes, but struggling with being able to separate levothyroxine and remember second set of morning doses     Restless legs/Sleep:   -pregabalin 200mg at bedtime  -ropinirole 3mg at bedtime  -ferrous sulfate 325mg at bedtime  -trazodone 200mg nightly     Pt had started pregabalin due to ropinirole causing quite a bit of sedation. Pregabalin has been quite helpful thus far and has been slowly cross titrating.  At last visit, pregabalin was continued at 200mg at bedtime and ropinirole reduced from " 3mg to 2.5mg at bedtime.    Today, he reported that he hasn't yet reduced the ropinirole dose. His mother passed away on 2/28, so has been quite preoccupied.  He stays up until he's quite tired to avoid restless legs if he's not quite tired enough. He then takes bedtime meds and falls asleep within 30 minutes without any leg issue. He generally stays asleep through the night unless needs to take care of the dog. Morning hangover feeling is a bit better, though still feels groggy for a while.  Leg pain and restlessness is feeling quite stable with this regimen.    ----------------    I spent 10 minutes with this patient today. All changes were made via collaborative practice agreement with [facility] [place]. A copy of the visit note was provided to the patient's provider(s).    A summary of these recommendations was sent via clinic portal.    Tiffany Mcdaniel PharmD  Medication Therapy Management Pharmacist  [facility] [place]      Telemedicine Visit Details  Type of service:  Telephone visit  Start Time:  9:00am  End Time:  9:10am     Medication Therapy Recommendations  No medication therapy recommendations to display       Again, thank you for allowing me to participate in the care of your patient.      Sincerely,    Tiffany Mcdaniel PharmD

## 2024-03-14 NOTE — PATIENT INSTRUCTIONS
"Recommendations from today's MTM visit:                                                         Reduce ropinirole from 3mg to 2.5mg per previous plan    Follow-up: 5/3/24    It was great speaking with you today.  I value your experience and would be very thankful for your time in providing feedback in our clinic survey. In the next few days, you may receive an email or text message from [employer] with a link to a survey related to your  clinical pharmacist.\"     To schedule another MTM appointment, please call the clinic directly or you may call the MTM scheduling line at [phone].    My Clinical Pharmacist's contact information:                                                      Please feel free to contact me with any questions or concerns you have.      Tiffany Mcdaniel, PharmD  Medication Therapy Management Pharmacist  [facility]    "

## 2024-03-14 NOTE — Clinical Note
Fyi- today Reduce ropinirole from 3mg to 2.5mg per previous plan (his mother recently passed away and did not make this change last month). He sees you in a few weeks and may consider increase pregabalin if needed at that time. Things are seeming stable today.

## 2024-03-23 ENCOUNTER — HEALTH MAINTENANCE LETTER (OUTPATIENT)
Age: 69
End: 2024-03-23

## 2024-04-03 ENCOUNTER — OFFICE VISIT (OUTPATIENT)
Dept: NEUROLOGY | Facility: CLINIC | Age: 69
End: 2024-04-03
Payer: COMMERCIAL

## 2024-04-03 VITALS — HEART RATE: 67 BPM | DIASTOLIC BLOOD PRESSURE: 70 MMHG | SYSTOLIC BLOOD PRESSURE: 116 MMHG

## 2024-04-03 DIAGNOSIS — G47.00 INSOMNIA, UNSPECIFIED TYPE: ICD-10-CM

## 2024-04-03 DIAGNOSIS — G25.81 RESTLESS LEG SYNDROME: Primary | ICD-10-CM

## 2024-04-03 PROCEDURE — G2211 COMPLEX E/M VISIT ADD ON: HCPCS | Performed by: PSYCHIATRY & NEUROLOGY

## 2024-04-03 PROCEDURE — 99214 OFFICE O/P EST MOD 30 MIN: CPT | Performed by: PSYCHIATRY & NEUROLOGY

## 2024-04-03 RX ORDER — FEXOFENADINE HCL 180 MG/1
180 TABLET ORAL DAILY
COMMUNITY

## 2024-04-03 NOTE — PATIENT INSTRUCTIONS
"Plan:   - Continue to wean off ropinirole by decreasing b 0.5 mg every 5-7 days until off  - If Restless Leg Syndrome symptoms worsen, increase pregabalin by 50 mg weekly until 300 mg nightly or a lowest effective dose. Please update the clinic if you decide to increase the dose.  - Will send Dr. Boles this note. Patrick will discuss with her regarding choosing an alternative medication to treat allergies other than an antihistamine as this may exacerbate Restless Leg Syndrome symptoms   - Continue management of DM and thyroid disease with Dr. Boles  - Treating insomnia can be difficult because the medications we use can be harmful, especially in older adults. In addition, sleep medications do not tend to be very effective and should only be used short-term. Cognitive behavioral therapy (CBT) is the most effective treatment for long-term insomnia. The goal of CBT for insomnia is to address the underlying causes of the sleep problems, including your habits and how you think about sleep. You can do CBT with a trained psychologist, or from the comfort of your home by following an online program or workbook. I am happy to place a sleep psychologist, if you are interested. Here are some great resources for at-home CBT:    1) 6-week online CBT course through [facility] for $40: [employer]/sleep  2) \"Say Calista to Insomnia\" by Aiden Costa, PhD at [employer]: 6-week program  3) \"Overcoming Insomnia: A Cognitive-Behavioral Therapy Approach Workbook\" by Mikey Salmon and Muriel Chery  4) \"Quiet Your Mind and Get to Sleep: Solutions to Insomnia for Those with Depression, Anxiety or Chronic Pain ([place] Self-Help Workbook) by Muriel Chery and Jane Arguelles  - Continue to follow with Dr. Tiffany Mcdaniel, PharmD, if needed    Patient to return in 6 months, for in-person visit, 30 minutes.   " 07/02/19        [address] [id number]      [facility] indicate that you have outstanding lab work and or testing that was ordered for you and has not yet been completed:  Orders Placed This Encounter      Hem

## 2024-04-03 NOTE — PROGRESS NOTES
Department of Neurology  Movement Disorders Division     Patient: Patrick Leal   MRN: [id number]   : 1955   Date of Visit: 2024    Impression:  Patrick Leal is a 68 year old male with Restless Leg Syndrome. The patient's main concern today is Restless Leg Syndrome.     Restless Leg Syndrome: Started pregabalin due to ropinirole causing quite a bit of sedation. Pregabalin has been quite helpful thus far and has been slowly cross titrating with plan to wean off ropinirole.   Insomnia     Plan:   - Continue to wean off ropinirole by decreasing b 0.5 mg every 5-7 days until off  - If Restless Leg Syndrome symptoms worsen, increase pregabalin by 50 mg weekly until 300 mg nightly or a lowest effective dose. Please update the clinic if you decide to increase the dose.  - Will send Dr. Boles this note. Patrick will discuss with her regarding choosing an alternative medication to treat allergies other than an antihistamine as this may exacerbate Restless Leg Syndrome symptoms   - Avoid caffeine, antidepressants (except bupropion), antipsychotics, metoclopramide, centrally acting histamines   - Continue to stay active with moderate regular exercise  - Treating insomnia can be difficult because the medications we use can be harmful, especially in older adults. In addition, sleep medications do not tend to be very effective and should only be used short-term. Cognitive behavioral therapy (CBT) is the most effective treatment for long-term insomnia. The goal of CBT for insomnia is to address the underlying causes of the sleep problems, including your habits and how you think about sleep. You can do CBT with a trained psychologist, or from the comfort of your home by following an online program or workbook. I am happy to place a sleep psychologist, if you are interested. Here are some great resources for at-home CBT:    1) 6-week online CBT course through [facility] for $40:  "[employer]/sleep  2) \"Say Calista to Insomnia\" by Aiden Costa, PhD at [employer]: 6-week program  3) \"Overcoming Insomnia: A Cognitive-Behavioral Therapy Approach Workbook\" by Mikey Salmon and Muriel Chery  4) \"Quiet Your Mind and Get to Sleep: Solutions to Insomnia for Those with Depression, Anxiety or Chronic Pain ([employer] Self-Help Workbook) by Muriel Chery and Jane Arguelles  - Continue to follow with Dr. Tiffany Mcdaniel, PharmD, if needed  - Continue management of DM and thyroid disease with Dr. Boles    Patient to return in 6 months, for in-person visit, 30 minutes.     [place],   Movement Disorders Specialist  [facility]     Note dictated using voice recognition software.   36 minutes spent on date of encounter doing chart reviews and exam and documentation and further activities as noted above.     The longitudinal plan of care for the diagnosis(es)/condition(s) as documented were addressed during this visit. Due to the added complexity in care, I will continue to support Patrick in the subsequent management and with ongoing continuity of care.       ------------------------------------------------------------------------------------------------------------      History of Present Illness  Mr. Leal is a pleasant 68 year old male that presents to Neurology Movement clinic for follow up regarding Restless Leg Syndrome  The patient was initially seen in neurologic consultation on 1/19/22 with Dr. Wilson and most recently 12/15/23 for management of Restless Leg Syndrome. Please see the comprehensive neurologic consultation notes from those dates in the Epic records for details.      Seen by Dr. Tiffany Mcdaniel, PharmD, on 3/2024 where they reduced ropinirole from 3mg to 2.5mg per previous plan.     History obtained from patient.   Main complaint: Restless Leg Syndrome   Patient reports his mother recently passed away after a hip " "fracture.  Planning to decrease to ropinirole 1.5 mg next week.    Restless Leg Syndrome: Regarding Restless Leg Syndrome, \"It's going pretty well.\" Sometimes he feels symptoms in the day. He may split ropinirole dose to 1 mg twice daily. In the past week, he has done this 2-3 times. He has not noticed worsening of Restless Leg Syndrome symptoms with decreasing dose of ropinirole to 2 mg (from 3 mg). His describes Restless Leg Syndrome symptoms as \"hurt\".  Sleep: Denies snoring.   He has 2 new grand daughters as of 10/2023, Joanna and Timothy.   He feels his balance is not good. He felt that Physical Therapy was not helpful.   Movement Disorder-related Medications                   Indication qhs       Pregabalin 200 mg  1       Ropinirole 2 mg  1       Ferrous sulfate 325 mg  1       Trazodone 200 mg  1         Review of Systems:  Other than that mentioned above, the remainder of 12 systems reviewed were negative.    PMH: unchanged  PSH: unchanged  FH: unchanged  SH: unchanged    Medications:  Current Outpatient Medications   Medication Sig Dispense Refill    aspirin 81 MG EC tablet Take 81 mg by mouth daily      atorvastatin (LIPITOR) 10 MG tablet Take 1 tablet by mouth daily      blood glucose (OPTIUM TEST STRIPS) test strip Dispense item covered by pt ins. E11.9 NIDDM type II - Test 1 time/day      blood glucose monitoring (ACCU-CHEK FASTCLIX) lancets Use as directed. Test 1 times per day.      cephALEXin (KEFLEX) 500 MG capsule Take 500 mg by mouth 2 times daily      Cholecalciferol (VITAMIN D) 2000 UNITS CAPS Take 1 capsule by mouth daily       chromium 200 MCG CAPS Take 200 mcg by mouth daily.      cinnamon 500 MG CAPS Take 1,000 mg by mouth 2 times daily       Contour Next EZ (CONTOUR NEXT EZ W/DEVICE KIT) w/Device KIT USE TO TEST BLOOD SUGARS 1 TIME DAILY.  E11.9 NIDDM TYPE II      cyanocobalamin (VITAMIN B-12) 1000 MCG tablet Take 1,000 mcg by mouth daily      diclofenac (VOLTAREN) 1 % GEL Place 4 g onto " the skin 4 times daily      dulaglutide (TRULICITY) 0.75 MG/0.5ML pen Inject 0.75 mg Subcutaneous every 7 days      DULoxetine (CYMBALTA) 60 MG capsule Take 60 mg by mouth 2 times daily      estradiol (VIVELLE-DOT) 0.025 MG/24HR Place 1 patch onto the skin twice a week 8 patch 7    FEROSUL 325 (65 Fe) MG tablet Take 1 Tablet (325 mg) by mouth once daily. Take on an empty stomach or with orange or tomato juice.      fexofenadine (ALLEGRA) 180 MG tablet Take 180 mg by mouth daily      fluticasone (FLONASE) 50 MCG/ACT nasal spray Spray 1 spray into both nostrils daily as needed for rhinitis or allergies      levothyroxine (SYNTHROID/LEVOTHROID) 175 MCG tablet Take 175 mcg by mouth daily On Mondays and Thursdays      levothyroxine (SYNTHROID/LEVOTHROID) 200 MCG tablet Take 200 mcg by mouth daily On all days except Mondays and Thursdays      lidocaine (LIDODERM) 5 % patch Place 2 patches onto the skin as needed       loperamide (IMODIUM) 2 MG capsule Take 2 mg by mouth 4 times daily as needed for diarrhea      LORAZEPAM PO Take 1 mg by mouth as needed       metFORMIN (GLUCOPHAGE XR) 500 MG 24 hr tablet Take 2,000 mg by mouth daily (with dinner)      ondansetron (ZOFRAN) 4 MG tablet Take 4 mg by mouth every 6 hours as needed      pantoprazole (PROTONIX) 40 MG EC tablet Take 40 mg by mouth daily      pregabalin (LYRICA) 100 MG capsule Take 2 capsules (200 mg) by mouth every evening 60 capsule 5    pregabalin (LYRICA) 50 MG capsule Increase Lyrica by 50 mg (1 tab) weekly to goal dose of 300 mg nightly 180 capsule 5    rOPINIRole (REQUIP) 1 MG tablet Take 2 mg by mouth at bedtime Along with 0.5mg for total of 2.5mg at bedtime      senna-docusate (SENOKOT-S/PERICOLACE) 8.6-50 MG tablet Take 1 tablet by mouth daily as needed      traZODone (DESYREL) 100 MG tablet Take 2 tablets by mouth nightly as needed       vitamin E 400 UNITS TABS Take 400 Units by mouth daily      rOPINIRole (REQUIP) 0.5 MG tablet Take 0.5 mg ropinirole  "with ropinirole 1 mg 3 tabs = 3.5 mg daily at 10-11 pm. (Patient not taking: Reported on 4/3/2024) 30 tablet 11           Allergies   Allergen Reactions    Zolpidem Tartrate      SLEEP WALKING    Amoxicillin      Stomach upset    Bacitracin      Other reaction(s): *Unknown    Erythromycin      Other reaction(s): GI Upset    Morphine      Other reaction(s): flushed feeling, Flushing  Facial flushing      Zolpidem Other (See Comments)     Other reaction(s): Agitation, Confusion, Hallucinations  SLEEP WALKING  Sleep Walking            Physical Exam:  The patient's  blood pressure is 116/70 and his pulse is 67.    Physical Exam:  GENERAL: alert, active, attentive, appropriately groomed   HEENT: normocephalic, eyes open with no discharge, nares patent, oropharynx clear-no lesions  CHEST: non labored breathing  EXTREMITIES: no edema/cyanosis in extremities visible during exam  PSYCH: mood stable     Neurologic Exam:  MENTAL STATUS: Alert and oriented to person, place, time, and situation. Follows commands. Recent and remote memory intact. Attention span and concentration intact. Fund of knowledge intact to current events.   SPEECH: Fluent, intact comprehension and articulation  CN: overall visual fields intact, facial movement symmetric, hearing grossly intact to conversation   MOTOR: Moves all extremities equally against gravity without difficulty  Involuntary movements: none  GAIT: dec'd arm swing and normal stride length, no en bloc turns, no ataxia. Walks with roller walker      Data Reviewed: I have personally reviewed the tests/studies below.     Lab Results   Component Value Date    A1C 6.4 12/15/2023    A1C 6.1 10/14/2015    A1C 6.0 07/28/2014    A1C 6.4 03/09/2013    A1C 7.1@ 08/30/2012    A1C 7.1 07/18/2012     TSH   Date Value Ref Range Status   12/15/2023 8.45 (H) 0.30 - 4.20 uIU/mL Final   03/09/2013 4.48 0.4 - 5.0 mU/L Final     CBC RESULTS: No results for input(s): \"WBC\", \"RBC\", \"HGB\", \"HCT\", \"MCV\", \"MCH\", " "\"MCHC\", \"RDW\", \"PLT\" in the last 33915 hours.  Last Comprehensive Metabolic Panel:  Sodium   Date Value Ref Range Status   12/15/2023 136 135 - 145 mmol/L Final     Comment:     Reference intervals for this test were updated on 09/26/2023 to more accurately reflect our healthy population. There may be differences in the flagging of prior results with similar values performed with this method. Interpretation of those prior results can be made in the context of the updated reference intervals.    10/17/2015 135 133 - 144 mmol/L Final     Potassium   Date Value Ref Range Status   12/15/2023 4.5 3.4 - 5.3 mmol/L Final   02/22/2022 3.4 (L) 3.5 - 5.0 mmol/L Final   10/17/2015 3.1 (L) 3.4 - 5.3 mmol/L Final     Chloride   Date Value Ref Range Status   12/15/2023 97 (L) 98 - 107 mmol/L Final   02/22/2022 96 (L) 98 - 107 mmol/L Final   10/17/2015 98 94 - 109 mmol/L Final     Carbon Dioxide   Date Value Ref Range Status   10/17/2015 28 20 - 32 mmol/L Final     Carbon Dioxide (CO2)   Date Value Ref Range Status   12/15/2023 28 22 - 29 mmol/L Final   02/22/2022 24 22 - 31 mmol/L Final     Anion Gap   Date Value Ref Range Status   12/15/2023 11 7 - 15 mmol/L Final   02/22/2022 12 5 - 18 mmol/L Final   10/17/2015 9 3 - 14 mmol/L Final     Glucose   Date Value Ref Range Status   12/15/2023 113 (H) 70 - 99 mg/dL Final   02/22/2022 143 (H) 70 - 125 mg/dL Final   10/17/2015 105 (H) 70 - 99 mg/dL Final     Urea Nitrogen   Date Value Ref Range Status   12/15/2023 6.5 (L) 8.0 - 23.0 mg/dL Final   02/22/2022 6 (L) 8 - 22 mg/dL Final   10/17/2015 12 7 - 30 mg/dL Final     Creatinine   Date Value Ref Range Status   12/15/2023 0.71 0.67 - 1.17 mg/dL Final   10/17/2015 0.75 0.66 - 1.25 mg/dL Final     GFR Estimate   Date Value Ref Range Status   12/15/2023 >90 >60 mL/min/1.73m2 Final   10/17/2015 >90  Non  GFR Calc   >60 mL/min/1.7m2 Final     Calcium   Date Value Ref Range Status   12/15/2023 10.2 8.8 - 10.2 mg/dL Final "   10/17/2015 9.1 8.5 - 10.1 mg/dL Final     Bilirubin Total   Date Value Ref Range Status   12/15/2023 0.4 <=1.2 mg/dL Final   10/14/2015 0.4 0.2 - 1.3 mg/dL Final     Alkaline Phosphatase   Date Value Ref Range Status   12/15/2023 95 40 - 150 U/L Final     Comment:     Reference intervals for this test were updated on 11/14/2023 to more accurately reflect our healthy population. There may be differences in the flagging of prior results with similar values performed with this method. Interpretation of those prior results can be made in the context of the updated reference intervals.   10/14/2015 58 40 - 150 U/L Final     ALT   Date Value Ref Range Status   12/15/2023 5 0 - 70 U/L Final     Comment:     Reference intervals for this test were updated on 6/12/2023 to more accurately reflect our healthy population. There may be differences in the flagging of prior results with similar values performed with this method. Interpretation of those prior results can be made in the context of the updated reference intervals.     10/14/2015 20 0 - 70 U/L Final     AST   Date Value Ref Range Status   12/15/2023 17 0 - 45 U/L Final     Comment:     Reference intervals for this test were updated on 6/12/2023 to more accurately reflect our healthy population. There may be differences in the flagging of prior results with similar values performed with this method. Interpretation of those prior results can be made in the context of the updated reference intervals.   10/14/2015 14 0 - 45 U/L Final

## 2024-04-03 NOTE — LETTER
4/3/2024         RE: Patrick Leal  [address]  [facility] [address]        Dear Colleague,    Thank you for referring your patient, Patrick Leal, to the [facility]. Please see a copy of my visit note below.    Department of Neurology  Movement Disorders Division     Patient: Patrick Leal   MRN: [id number]   : 1955   Date of Visit: 2024    Impression:  Patrick Leal is a 68 year old male with Restless Leg Syndrome. The patient's main concern today is Restless Leg Syndrome.     Restless Leg Syndrome: Started pregabalin due to ropinirole causing quite a bit of sedation. Pregabalin has been quite helpful thus far and has been slowly cross titrating with plan to wean off ropinirole.   Insomnia     Plan:   - Continue to wean off ropinirole by decreasing b 0.5 mg every 5-7 days until off  - If Restless Leg Syndrome symptoms worsen, increase pregabalin by 50 mg weekly until 300 mg nightly or a lowest effective dose. Please update the clinic if you decide to increase the dose.  - Will send Dr. Boles this note. Patrick will discuss with her regarding choosing an alternative medication to treat allergies other than an antihistamine as this may exacerbate Restless Leg Syndrome symptoms   - Avoid caffeine, antidepressants (except bupropion), antipsychotics, metoclopramide, centrally acting histamines   - Continue to stay active with moderate regular exercise  - Treating insomnia can be difficult because the medications we use can be harmful, especially in older adults. In addition, sleep medications do not tend to be very effective and should only be used short-term. Cognitive behavioral therapy (CBT) is the most effective treatment for long-term insomnia. The goal of CBT for insomnia is to address the underlying causes of the sleep problems, including your habits and how you think about sleep. You can do CBT with a trained  "psychologist, or from the comfort of your home by following an online program or workbook. I am happy to place a sleep psychologist, if you are interested. Here are some great resources for at-home CBT:    1) 6-week online CBT course through [facility] for $40: [employer]sleep  2) \"Say Calista to Insomnia\" by Aiden Costa, PhD at [employer]: 6-week program  3) \"Overcoming Insomnia: A Cognitive-Behavioral Therapy Approach Workbook\" by Mikey Salmon and Muriel Chery  4) \"Quiet Your Mind and Get to Sleep: Solutions to Insomnia for Those with Depression, Anxiety or Chronic Pain ([place] Self-Help Workbook) by Muriel Chery and Jane Arguelles  - Continue to follow with Dr. Tiffany Mcdaniel, PharmD, if needed  - Continue management of DM and thyroid disease with Dr. Boles    Patient to return in 6 months, for in-person visit, 30 minutes.     Miracle Mills,   Movement Disorders Specialist  [facility]     Note dictated using voice recognition software.   36 minutes spent on date of encounter doing chart reviews and exam and documentation and further activities as noted above.     The longitudinal plan of care for the diagnosis(es)/condition(s) as documented were addressed during this visit. Due to the added complexity in care, I will continue to support Patrick in the subsequent management and with ongoing continuity of care.       ------------------------------------------------------------------------------------------------------------      History of Present Illness  Mr. Leal is a pleasant 68 year old male that presents to Neurology Movement clinic for follow up regarding Restless Leg Syndrome  The patient was initially seen in neurologic consultation on 1/19/22 with Dr. Wilson and most recently 12/15/23 for management of Restless Leg Syndrome. Please see the comprehensive neurologic consultation notes from those dates in the Epic records " "for details.      Seen by Dr. Tiffany Mcdaniel, PharmD, on 3/2024 where they reduced ropinirole from 3mg to 2.5mg per previous plan.     History obtained from patient.   Main complaint: Restless Leg Syndrome   Patient reports his mother recently passed away after a hip fracture.  Planning to decrease to ropinirole 1.5 mg next week.    Restless Leg Syndrome: Regarding Restless Leg Syndrome, \"It's going pretty well.\" Sometimes he feels symptoms in the day. He may split ropinirole dose to 1 mg twice daily. In the past week, he has done this 2-3 times. He has not noticed worsening of Restless Leg Syndrome symptoms with decreasing dose of ropinirole to 2 mg (from 3 mg). His describes Restless Leg Syndrome symptoms as \"hurt\".  Sleep: Denies snoring.   He has 2 new grand daughters as of 10/2023, Joanna and Timothy.   He feels his balance is not good. He felt that Physical Therapy was not helpful.   Movement Disorder-related Medications                   Indication qhs       Pregabalin 200 mg  1       Ropinirole 2 mg  1       Ferrous sulfate 325 mg  1       Trazodone 200 mg  1         Review of Systems:  Other than that mentioned above, the remainder of 12 systems reviewed were negative.    PMH: unchanged  PSH: unchanged  FH: unchanged  SH: unchanged    Medications:  Current Outpatient Medications   Medication Sig Dispense Refill     aspirin 81 MG EC tablet Take 81 mg by mouth daily       atorvastatin (LIPITOR) 10 MG tablet Take 1 tablet by mouth daily       blood glucose (OPTIUM TEST STRIPS) test strip Dispense item covered by pt ins. E11.9 NIDDM type II - Test 1 time/day       blood glucose monitoring (ACCU-CHEK FASTCLIX) lancets Use as directed. Test 1 times per day.       cephALEXin (KEFLEX) 500 MG capsule Take 500 mg by mouth 2 times daily       Cholecalciferol (VITAMIN D) 2000 UNITS CAPS Take 1 capsule by mouth daily        chromium 200 MCG CAPS Take 200 mcg by mouth daily.       cinnamon 500 MG CAPS Take 1,000 mg by " mouth 2 times daily        Contour Next EZ (CONTOUR NEXT EZ W/DEVICE KIT) w/Device KIT USE TO TEST BLOOD SUGARS 1 TIME DAILY.  E11.9 NIDDM TYPE II       cyanocobalamin (VITAMIN B-12) 1000 MCG tablet Take 1,000 mcg by mouth daily       diclofenac (VOLTAREN) 1 % GEL Place 4 g onto the skin 4 times daily       dulaglutide (TRULICITY) 0.75 MG/0.5ML pen Inject 0.75 mg Subcutaneous every 7 days       DULoxetine (CYMBALTA) 60 MG capsule Take 60 mg by mouth 2 times daily       estradiol (VIVELLE-DOT) 0.025 MG/24HR Place 1 patch onto the skin twice a week 8 patch 7     FEROSUL 325 (65 Fe) MG tablet Take 1 Tablet (325 mg) by mouth once daily. Take on an empty stomach or with orange or tomato juice.       fexofenadine (ALLEGRA) 180 MG tablet Take 180 mg by mouth daily       fluticasone (FLONASE) 50 MCG/ACT nasal spray Spray 1 spray into both nostrils daily as needed for rhinitis or allergies       levothyroxine (SYNTHROID/LEVOTHROID) 175 MCG tablet Take 175 mcg by mouth daily On Mondays and Thursdays       levothyroxine (SYNTHROID/LEVOTHROID) 200 MCG tablet Take 200 mcg by mouth daily On all days except Mondays and Thursdays       lidocaine (LIDODERM) 5 % patch Place 2 patches onto the skin as needed        loperamide (IMODIUM) 2 MG capsule Take 2 mg by mouth 4 times daily as needed for diarrhea       LORAZEPAM PO Take 1 mg by mouth as needed        metFORMIN (GLUCOPHAGE XR) 500 MG 24 hr tablet Take 2,000 mg by mouth daily (with dinner)       ondansetron (ZOFRAN) 4 MG tablet Take 4 mg by mouth every 6 hours as needed       pantoprazole (PROTONIX) 40 MG EC tablet Take 40 mg by mouth daily       pregabalin (LYRICA) 100 MG capsule Take 2 capsules (200 mg) by mouth every evening 60 capsule 5     pregabalin (LYRICA) 50 MG capsule Increase Lyrica by 50 mg (1 tab) weekly to goal dose of 300 mg nightly 180 capsule 5     rOPINIRole (REQUIP) 1 MG tablet Take 2 mg by mouth at bedtime Along with 0.5mg for total of 2.5mg at bedtime        senna-docusate (SENOKOT-S/PERICOLACE) 8.6-50 MG tablet Take 1 tablet by mouth daily as needed       traZODone (DESYREL) 100 MG tablet Take 2 tablets by mouth nightly as needed        vitamin E 400 UNITS TABS Take 400 Units by mouth daily       rOPINIRole (REQUIP) 0.5 MG tablet Take 0.5 mg ropinirole with ropinirole 1 mg 3 tabs = 3.5 mg daily at 10-11 pm. (Patient not taking: Reported on 4/3/2024) 30 tablet 11           Allergies   Allergen Reactions     Zolpidem Tartrate      SLEEP WALKING     Amoxicillin      Stomach upset     Bacitracin      Other reaction(s): *Unknown     Erythromycin      Other reaction(s): GI Upset     Morphine      Other reaction(s): flushed feeling, Flushing  Facial flushing       Zolpidem Other (See Comments)     Other reaction(s): Agitation, Confusion, Hallucinations  SLEEP WALKING  Sleep Walking            Physical Exam:  The patient's  blood pressure is 116/70 and his pulse is 67.    Physical Exam:  GENERAL: alert, active, attentive, appropriately groomed   HEENT: normocephalic, eyes open with no discharge, nares patent, oropharynx clear-no lesions  CHEST: non labored breathing  EXTREMITIES: no edema/cyanosis in extremities visible during exam  PSYCH: mood stable     Neurologic Exam:  MENTAL STATUS: Alert and oriented to person, place, time, and situation. Follows commands. Recent and remote memory intact. Attention span and concentration intact. Fund of knowledge intact to current events.   SPEECH: Fluent, intact comprehension and articulation  CN: overall visual fields intact, facial movement symmetric, hearing grossly intact to conversation   MOTOR: Moves all extremities equally against gravity without difficulty  Involuntary movements: none  GAIT: dec'd arm swing and normal stride length, no en bloc turns, no ataxia. Walks with roller walker      Data Reviewed: I have personally reviewed the tests/studies below.     Lab Results   Component Value Date    A1C 6.4 12/15/2023    A1C 6.1  "10/14/2015    A1C 6.0 07/28/2014    A1C 6.4 03/09/2013    A1C 7.1@ 08/30/2012    A1C 7.1 07/18/2012     TSH   Date Value Ref Range Status   12/15/2023 8.45 (H) 0.30 - 4.20 uIU/mL Final   03/09/2013 4.48 0.4 - 5.0 mU/L Final     CBC RESULTS: No results for input(s): \"WBC\", \"RBC\", \"HGB\", \"HCT\", \"MCV\", \"MCH\", \"MCHC\", \"RDW\", \"PLT\" in the last 04650 hours.  Last Comprehensive Metabolic Panel:  Sodium   Date Value Ref Range Status   12/15/2023 136 135 - 145 mmol/L Final     Comment:     Reference intervals for this test were updated on 09/26/2023 to more accurately reflect our healthy population. There may be differences in the flagging of prior results with similar values performed with this method. Interpretation of those prior results can be made in the context of the updated reference intervals.    10/17/2015 135 133 - 144 mmol/L Final     Potassium   Date Value Ref Range Status   12/15/2023 4.5 3.4 - 5.3 mmol/L Final   02/22/2022 3.4 (L) 3.5 - 5.0 mmol/L Final   10/17/2015 3.1 (L) 3.4 - 5.3 mmol/L Final     Chloride   Date Value Ref Range Status   12/15/2023 97 (L) 98 - 107 mmol/L Final   02/22/2022 96 (L) 98 - 107 mmol/L Final   10/17/2015 98 94 - 109 mmol/L Final     Carbon Dioxide   Date Value Ref Range Status   10/17/2015 28 20 - 32 mmol/L Final     Carbon Dioxide (CO2)   Date Value Ref Range Status   12/15/2023 28 22 - 29 mmol/L Final   02/22/2022 24 22 - 31 mmol/L Final     Anion Gap   Date Value Ref Range Status   12/15/2023 11 7 - 15 mmol/L Final   02/22/2022 12 5 - 18 mmol/L Final   10/17/2015 9 3 - 14 mmol/L Final     Glucose   Date Value Ref Range Status   12/15/2023 113 (H) 70 - 99 mg/dL Final   02/22/2022 143 (H) 70 - 125 mg/dL Final   10/17/2015 105 (H) 70 - 99 mg/dL Final     Urea Nitrogen   Date Value Ref Range Status   12/15/2023 6.5 (L) 8.0 - 23.0 mg/dL Final   02/22/2022 6 (L) 8 - 22 mg/dL Final   10/17/2015 12 7 - 30 mg/dL Final     Creatinine   Date Value Ref Range Status   12/15/2023 0.71 0.67 - " 1.17 mg/dL Final   10/17/2015 0.75 0.66 - 1.25 mg/dL Final     GFR Estimate   Date Value Ref Range Status   12/15/2023 >90 >60 mL/min/1.73m2 Final   10/17/2015 >90  Non  GFR Calc   >60 mL/min/1.7m2 Final     Calcium   Date Value Ref Range Status   12/15/2023 10.2 8.8 - 10.2 mg/dL Final   10/17/2015 9.1 8.5 - 10.1 mg/dL Final     Bilirubin Total   Date Value Ref Range Status   12/15/2023 0.4 <=1.2 mg/dL Final   10/14/2015 0.4 0.2 - 1.3 mg/dL Final     Alkaline Phosphatase   Date Value Ref Range Status   12/15/2023 95 40 - 150 U/L Final     Comment:     Reference intervals for this test were updated on 11/14/2023 to more accurately reflect our healthy population. There may be differences in the flagging of prior results with similar values performed with this method. Interpretation of those prior results can be made in the context of the updated reference intervals.   10/14/2015 58 40 - 150 U/L Final     ALT   Date Value Ref Range Status   12/15/2023 5 0 - 70 U/L Final     Comment:     Reference intervals for this test were updated on 6/12/2023 to more accurately reflect our healthy population. There may be differences in the flagging of prior results with similar values performed with this method. Interpretation of those prior results can be made in the context of the updated reference intervals.     10/14/2015 20 0 - 70 U/L Final     AST   Date Value Ref Range Status   12/15/2023 17 0 - 45 U/L Final     Comment:     Reference intervals for this test were updated on 6/12/2023 to more accurately reflect our healthy population. There may be differences in the flagging of prior results with similar values performed with this method. Interpretation of those prior results can be made in the context of the updated reference intervals.   10/14/2015 14 0 - 45 U/L Final                        Again, thank you for allowing me to participate in the care of your patient.        Sincerely,        Miracle WHITFIELD  DO Gene

## 2024-04-03 NOTE — NURSING NOTE
Chief Complaint   Patient presents with    Follow Up     Restless leg syndrome       BEHZAD Young on 4/3/2024 at 11:08 AM

## 2024-04-08 ENCOUNTER — LAB REQUISITION (OUTPATIENT)
Dept: LAB | Facility: CLINIC | Age: 69
End: 2024-04-08

## 2024-04-08 DIAGNOSIS — E61.1 IRON DEFICIENCY: ICD-10-CM

## 2024-04-08 LAB
IRON BINDING CAPACITY (ROCHE): 353 UG/DL (ref 240–430)
IRON SATN MFR SERPL: 15 % (ref 15–46)
IRON SERPL-MCNC: 52 UG/DL (ref 61–157)

## 2024-04-08 PROCEDURE — 83550 IRON BINDING TEST: CPT | Performed by: STUDENT IN AN ORGANIZED HEALTH CARE EDUCATION/TRAINING PROGRAM

## 2024-05-02 NOTE — PROGRESS NOTES
"Medication Therapy Management (MTM) Encounter    ASSESSMENT:                            Medication Adherence/Access: No issues identified    RLS/sleep:   Discussed continuing with cross titration, increasing pregabalin to 300 mg nightly and reducing ropinirole to 1 mg nightly.  Discussed that lightheadedness could be from multiple reasons, including pregabalin, tramadol, or poor sleep.  Encouraged his efforts at trying to reduce soda/caffeine intake.  Continue working on reduction, as this could contribute to restless legs.    May consider dividing pregabalin dose in the future, the first a couple hours before bed and the second right at bedtime.      PLAN:                            -Increase pregabalin to 300 mg nightly  - Reduce ropinirole from 1.5 mg to 1 mg nightly  -keep up the good work with trying to reduce caffeine!    Follow-up: Neurology in 1 month  MTM 2 to 3 months or sooner if needed    SUBJECTIVE/OBJECTIVE:                          Patrick Leal is a 68 year old male called for a follow-up visit.       Reason for visit: med check.    Allergies/ADRs: Reviewed in chart  Past Medical History: Reviewed in chart  Tobacco: He reports that he has never smoked. He has never used smokeless tobacco.  Alcohol: rarely  Caffeine:  large fountain Pepsi daily (50-60oz), sometimes additional bottle    Has been trying to reduce to 24oz daily, though sometimes has more  Medication Adherence/Access: no issues reported    Medication Adherence/Access: Not discussed today-Per previous notes:  forgets morning doses \"way too frequently\" plus tends to ignore reminders \"because I rebel at structure\". \"I'd like to think I take morning doses at least twice a week\"  PM meds are usually about 30 minutes before bedtime and bedtime is not consistent  -Uses AM and PM pill boxes, but struggling with being able to separate levothyroxine and remember second set of morning doses     Restless legs/Sleep:   -pregabalin 250mg at " bedtime  -ropinirole 1.5mg at bedtime  -ferrous sulfate 325mg at bedtime  -trazodone 200mg nightly    Since last visit, he saw Dr. Mills on 4/3/24, at which time:  - Continue to wean off ropinirole by decreasing b 0.5 mg every 5-7 days until off  - If Restless Leg Syndrome symptoms worsen, increase pregabalin by 50 mg weekly until 300 mg nightly or a lowest effective dose. Please update the clinic if you decide to increase the dose.    Today, he states that cross titration from ropinirole to pregabalin has been challenging due to variability in symptoms. He'd recently been trying to get to 1mg of ropinirole, but noticed a lot more pain in his legs, so has continued at 1.5mg for this week. If pain is very severe, he may also take a tramadol. Has been noticing some symptoms when resting after lunch and dinner on some days.   Can often get to bed ok initially, but this does not seem to follow any kind of pattern. Has been feeling a bit lightheaded at times.    He's been working on reducing soda intake, sometimes trying to have a single 24oz bottle of Pepsi for the day, though will have more when he goes out to lunch with a friend (was previously having 50-60oz per day, but no longer going to the fountain at [employer] as much).  ----------------      I spent 15 minutes with this patient today. All changes were made via collaborative practice agreement with Miracle Mills. A copy of the visit note was provided to the patient's provider(s).    A summary of these recommendations was sent via clinic portal.    Tiffany Mcdaniel, PharmD  Medication Therapy Management Pharmacist  [facility]      Telemedicine Visit Details  Type of service:  Telephone visit  Start Time:  8:30am  End Time:  8:45am     Medication Therapy Recommendations  Restless leg syndrome    Current Medication: pregabalin (LYRICA) 100 MG capsule   Rationale: Dose too low - Dosage too low - Effectiveness    Recommendation: Increase Dose - continue increasing the dose per previous plan   Status: Patient Agreed - Adherence/Education          Current Medication: rOPINIRole (REQUIP) 0.5 MG tablet   Rationale: Dose too high - Dosage too high - Safety   Recommendation: Decrease Dose - continue tapering the dose per previous plan   Status: Patient Agreed - Adherence/Education

## 2024-05-03 ENCOUNTER — VIRTUAL VISIT (OUTPATIENT)
Dept: NEUROLOGY | Facility: CLINIC | Age: 69
End: 2024-05-03
Attending: PSYCHIATRY & NEUROLOGY
Payer: COMMERCIAL

## 2024-05-03 DIAGNOSIS — G47.00 INSOMNIA, UNSPECIFIED TYPE: ICD-10-CM

## 2024-05-03 DIAGNOSIS — G25.81 RESTLESS LEG SYNDROME: Primary | ICD-10-CM

## 2024-05-03 RX ORDER — TRAMADOL HYDROCHLORIDE 50 MG/1
25-50 TABLET ORAL
COMMUNITY
Start: 2024-04-01

## 2024-05-03 NOTE — Clinical Note
"5/3/2024       RE: Patrick Leal  [address]  [facility] [address]     Dear Colleague,    Thank you for referring your patient, Patrick Leal, to the [facility] MULTIPLE SCLEROSIS CLINIC [facility]. Please see a copy of my visit note below.    Medication Therapy Management (MTM) Encounter    ASSESSMENT:                            Medication Adherence/Access: {adherencechoices:834535}    ***:   ***    PLAN:                            ***    Follow-up: {followuptest2:938323}    SUBJECTIVE/OBJECTIVE:                          Patrick Leal is a 68 year old male { :537302} for {mtmvisit:361251}     Reason for visit: ***.    Allergies/ADRs: {1/2/3/4/5:460544}  Past Medical History: {1/2/3/4/5:527013}  Tobacco: He reports that he has never smoked. He has never used smokeless tobacco.  Alcohol: {ALCOHOL CONSUMPTION HX:441205}  {Social and Goals:946394}  Medication Adherence/Access: {fumedadherence:432614}    {MTM SUBJECTIVE (Optional):290093}    Medication Adherence/Access: forgets morning doses \"way too frequently\" plus tends to ignore reminders \"because I rebel at structure\". \"I'd like to think I take morning doses at least twice a week\"  PM meds are usually about 30 minutes before bedtime and bedtime is not consistent  -Uses AM and PM pill boxes, but struggling with being able to separate levothyroxine and remember second set of morning doses     Restless legs/Sleep:   -pregabalin 250mg at bedtime  -ropinirole 1.5mg at bedtime  -ferrous sulfate 325mg at bedtime  -trazodone 200mg nightly    Since last visit, he saw Dr. Mills on 4/3/24, at which time:  - Continue to wean off ropinirole by decreasing b 0.5 mg every 5-7 days until off  - If Restless Leg Syndrome symptoms worsen, increase pregabalin by 50 mg weekly until 300 mg nightly or a lowest effective dose. Please update the clinic if you decide to increase the " dose.    Today, he states that cross titration from ropinirole to pregabalin has been challenging due to variability in symptoms. He'd recently been trying to get to 1mg of ropinirole, but noticed more pain in his legs, so has continued at 1.5mg for this week. If pain is very severe, he may also take a tramadol.         Pt had started pregabalin due to ropinirole causing quite a bit of sedation. Pregabalin has been quite helpful thus far and has been slowly cross titrating.  At last visit, pregabalin was continued at 200mg at bedtime and ropinirole reduced from 3mg to 2.5mg at bedtime.     Today, he reported that he hasn't yet reduced the ropinirole dose. His mother passed away on 2/28, so has been quite preoccupied.  He stays up until he's quite tired to avoid restless legs if he's not quite tired enough. He then takes bedtime meds and falls asleep within 30 minutes without any leg issue. He generally stays asleep through the night unless needs to take care of the dog. Morning hangover feeling is a bit better, though still feels groggy for a while.  Leg pain and restlessness is feeling quite stable with this regimen.          Restless Leg Syndrome: Started pregabalin due to ropinirole causing quite a bit of sedation. Pregabalin has been quite helpful thus far and has been slowly cross titrating with plan to wean off ropinirole.   Insomnia      Plan:     - Will send Dr. Boles this note. Patrick will discuss with her regarding choosing an alternative medication to treat allergies other than an antihistamine as this may exacerbate Restless Leg Syndrome symptoms   - Avoid caffeine, antidepressants (except bupropion), antipsychotics, metoclopramide, centrally acting histamines     ***:   ***    Today's Vitals: There were no vitals taken for this visit.  ----------------  {JEANINE?:604364}    I spent {mtm total time 3:790881} with this patient today{MTMpartdbillingquestion:352916}. { :029667}. A copy of the visit note was  "provided to the patient's provider(s).    A summary of these recommendations {GIVEN/NOT GIVEN:723716}.    ***    Telemedicine Visit Details  Type of service:  {[facility]25[facility]::\"Telephone visit\"}  Start Time: {video/phone visit start time:152948}  End Time: {video/phone visit end time:152948}     Medication Therapy Recommendations  No medication therapy recommendations to display     Medication Therapy Management (MTM) Encounter    ASSESSMENT:                            Medication Adherence/Access: No issues identified    RLS/sleep:   Discussed continuing with cross titration, increasing pregabalin to 300 mg nightly and reducing ropinirole to 1 mg nightly.  Discussed that lightheadedness could be from multiple reasons, including pregabalin, tramadol, or poor sleep.  May consider dividing pregabalin dose in the future, the first a couple hours before bed and the second right at bedtime.  Encouraged his efforts at trying to reduce soda/caffeine intake.  Continue working on reduction, as this could contribute to restless legs.    PLAN:                            -Increase pregabalin to 300 mg nightly  - Reduce ropinirole from 1.5 mg to 1 mg nightly    Follow-up: Neurology in 1 month  MTM 2 to 3 months or sooner if needed    SUBJECTIVE/OBJECTIVE:                          Patrick Leal is a 68 year old male called for a follow-up visit.       Reason for visit: med check.    Allergies/ADRs: Reviewed in chart  Past Medical History: Reviewed in chart  Tobacco: He reports that he has never smoked. He has never used smokeless tobacco.  Alcohol: rarely  Caffeine:  large fountain Pepsi daily (50-60oz), sometimes additional bottle    Has been trying to reduce to 24oz daily, though sometimes has more  Medication Adherence/Access: no issues reported    Medication Adherence/Access:   forgets morning doses \"way too frequently\" plus tends to ignore reminders \"because I rebel at structure\". \"I'd like to think I take morning " "doses at least twice a week\"  PM meds are usually about 30 minutes before bedtime and bedtime is not consistent  -Uses AM and PM pill boxes, but struggling with being able to separate levothyroxine and remember second set of morning doses     Restless legs/Sleep:   -pregabalin 250mg at bedtime  -ropinirole 1.5mg at bedtime  -ferrous sulfate 325mg at bedtime  -trazodone 200mg nightly    Since last visit, he saw Dr. Mills on 4/3/24, at which time:  - Continue to wean off ropinirole by decreasing b 0.5 mg every 5-7 days until off  - If Restless Leg Syndrome symptoms worsen, increase pregabalin by 50 mg weekly until 300 mg nightly or a lowest effective dose. Please update the clinic if you decide to increase the dose.    Today, he states that cross titration from ropinirole to pregabalin has been challenging due to variability in symptoms. He'd recently been trying to get to 1mg of ropinirole, but noticed a lot more pain in his legs, so has continued at 1.5mg for this week. If pain is very severe, he may also take a tramadol. Has been noticing some symptoms when resting after lunch and dinner on some days.   Can often get to bed ok initially, but this does not seem to follow any kind of pattern. Has been feeling a bit lightheaded at times.    He's been working on reducing soda intake, sometimes trying to have a single 24oz bottle of Pepsi for the day, though will have more when he goes out to lunch with a friend (was previously having 50-60oz per day, but no longer going to the fountain at [employer] as much).  ----------------  {JEANINE?:944633}    I spent {[facility] total time 3:548788} with this patient today{MTMpartdbillingquestion:825053}. { :998632}. A copy of the visit note was provided to the patient's provider(s).    A summary of these recommendations {GIVEN/NOT GIVEN:620805}.    ***    Telemedicine Visit Details  Type of service:  {telemedvisitmtm:110207::\"Telephone visit\"}  Start Time: {video/phone visit start " time:152948}  End Time: {video/phone visit end time:152948}     Medication Therapy Recommendations  No medication therapy recommendations to display       Again, thank you for allowing me to participate in the care of your patient.      Sincerely,    Tiffany Mcdaniel, CristinaD

## 2024-05-03 NOTE — PATIENT INSTRUCTIONS
"Recommendations from today's MTM visit:                                                         -Increase pregabalin to 300 mg nightly  - Reduce ropinirole from 1.5 mg to 1 mg nightly    Follow-up: Neurology in 1 month  MTM 2 to 3 months or sooner if needed    It was great speaking with you today.  I value your experience and would be very thankful for your time in providing feedback in our clinic survey. In the next few days, you may receive an email or text message from [employer] with a link to a survey related to your  clinical pharmacist.\"     To schedule another MTM appointment, please call the clinic directly or you may call the MTM scheduling line at [phone].    My Clinical Pharmacist's contact information:                                                      Please feel free to contact me with any questions or concerns you have.      Tiffany Mcdaniel, PharmD  Medication Therapy Management Pharmacist  [facility]    "

## 2024-06-12 ENCOUNTER — OFFICE VISIT (OUTPATIENT)
Dept: NEUROLOGY | Facility: CLINIC | Age: 69
End: 2024-06-12
Payer: COMMERCIAL

## 2024-06-12 ENCOUNTER — MYC MEDICAL ADVICE (OUTPATIENT)
Dept: NEUROLOGY | Facility: CLINIC | Age: 69
End: 2024-06-12

## 2024-06-12 VITALS — SYSTOLIC BLOOD PRESSURE: 106 MMHG | HEART RATE: 69 BPM | DIASTOLIC BLOOD PRESSURE: 69 MMHG

## 2024-06-12 DIAGNOSIS — G25.81 RESTLESS LEG SYNDROME: Primary | ICD-10-CM

## 2024-06-12 PROCEDURE — 99214 OFFICE O/P EST MOD 30 MIN: CPT | Performed by: PSYCHIATRY & NEUROLOGY

## 2024-06-12 RX ORDER — ROPINIROLE 0.5 MG/1
TABLET, FILM COATED ORAL
Qty: 15 TABLET | Refills: 11 | Status: SHIPPED | OUTPATIENT
Start: 2024-07-22

## 2024-06-12 RX ORDER — PREGABALIN 100 MG/1
300 CAPSULE ORAL EVERY EVENING
Qty: 270 CAPSULE | Refills: 3 | Status: SHIPPED | OUTPATIENT
Start: 2024-06-12 | End: 2024-08-08

## 2024-06-12 RX ORDER — DULAGLUTIDE 1.5 MG/.5ML
1.5 INJECTION, SOLUTION SUBCUTANEOUS
COMMUNITY
Start: 2024-05-31

## 2024-06-12 NOTE — LETTER
2024      Patrick Leal  [address]  [facility] [address]      Dear Colleague,    Thank you for referring your patient, Patrick Leal, to the [facility] NEUROLOGY CLINIC [facility]. Please see a copy of my visit note below.    Department of Neurology  Movement Disorders Division     Patient: Patrick Leal   MRN: [id number]   : 1955   Date of Visit: 2024    Impression:  Patrick Leal is a 68 year old male with Restless Leg Syndrome. The patient's main concern today is Restless Leg Syndrome symptoms. We discussed     Restless Leg Syndrome: Started pregabalin due to ropinirole causing quite a bit of sedation. Pregabalin has been quite helpful thus far and has been slowly cross titrating with plan to wean off ropinirole.   Insomnia     Plan:   - Increase pregabalin to 300 mg nightly.   - Stop ropinirole.  - If increased dose of pregabalin causes side effects, return to 250 mg and okay to restart ropinirole 0.5 mg 1 tab an hour prior to bed.   - For intolerable Restless Leg Syndrome symptoms, okay to take ropinirole 0.5 mg 1 tab nightly as a rescue medication.   - Will send Dr. Boles this note. Patrick will discuss with her regarding choosing an alternative medication to treat allergies other than an antihistamine as this may exacerbate Restless Leg Syndrome symptoms   - Avoid caffeine, antidepressants (except bupropion), antipsychotics, metoclopramide, centrally acting histamines   - Continue to stay active with moderate regular exercise  - Continue following with Dr. Tiffany Mcdaniel, PharmD, as needed     Patient to return in 6 months, for in-person visit, 30 minutes.     Miracle Mills DO  Movement Disorders Specialist  [facility]     Note dictated using voice recognition software.   15 minutes spent on date of encounter doing chart reviews and exam and documentation and further activities as noted above.       "  ------------------------------------------------------------------------------------------------------------      History of Present Illness  Mr. Leal is a pleasant 68 year old male that presents to Neurology Movement clinic for follow up regarding Restless Leg Syndrome management.   The patient was initially seen in neurologic consultation on 11/10/22 and most recently 4/3/24 for management of Restless Leg Syndrome. Please see the comprehensive neurologic consultation notes from those dates in the Epic records for details.      History obtained from patient.Main complaint:   Patient reports  Restless Leg Syndrome: Current Restless Leg Syndrome medication regimen is \"working pretty good\" but still could use improvement.  He reports night pain in legs presumably from Restless Leg Syndrome. He has Restless Leg Syndrome symptoms in bottoms of both feet. He treats Restless Leg Syndrome symptoms with the below medications and trmadol and lidocaine patches to bottoms of both feet, which all help.   Sleep: Okay. Loves caffeine and this may interfere with sleep. Takes trazodone to help him sleep.   Ropinirole dose: see below. He feels that he sleep more than he used to.    is 50 year wedding anniversary.   His mother  in 2024 and sometimes feels overwhelmed.   Movement Disorder-related Medications                   Indication 11 pm           Pregabalin 50 mg RLS 1       Pregabalin 200 mg  RLS 1           Ropinirole 0.5 mg   RLS 1           Ferrous sulfate 325 mg   1           Trazodone 200 mg  insomnia  1               Review of Systems:  Other than that mentioned above, the remainder of 12 systems reviewed were negative.    PMH: unchanged  PSH: unchanged  FH: unchanged  SH: unchanged    Medications:  Current Outpatient Medications   Medication Sig Dispense Refill     aspirin 81 MG EC tablet Take 81 mg by mouth daily       atorvastatin (LIPITOR) 10 MG tablet Take 1 tablet by mouth daily       blood glucose " (OPTIUM TEST STRIPS) test strip Dispense item covered by pt ins. E11.9 NIDDM type II - Test 1 time/day       blood glucose monitoring (ACCU-CHEK FASTCLIX) lancets Use as directed. Test 1 times per day.       cephALEXin (KEFLEX) 500 MG capsule Take 500 mg by mouth 2 times daily       Cholecalciferol (VITAMIN D) 2000 UNITS CAPS Take 1 capsule by mouth daily        chromium 200 MCG CAPS Take 200 mcg by mouth daily.       cinnamon 500 MG CAPS Take 1,000 mg by mouth 2 times daily        Contour Next EZ (CONTOUR NEXT EZ W/DEVICE KIT) w/Device KIT USE TO TEST BLOOD SUGARS 1 TIME DAILY.  E11.9 NIDDM TYPE II       cyanocobalamin (VITAMIN B-12) 1000 MCG tablet Take 1,000 mcg by mouth daily       diclofenac (VOLTAREN) 1 % GEL Place 4 g onto the skin 4 times daily       DULoxetine (CYMBALTA) 60 MG capsule Take 60 mg by mouth 2 times daily       estradiol (VIVELLE-DOT) 0.025 MG/24HR Place 1 patch onto the skin twice a week 8 patch 7     FEROSUL 325 (65 Fe) MG tablet Take 1 Tablet (325 mg) by mouth once daily. Take on an empty stomach or with orange or tomato juice.       fexofenadine (ALLEGRA) 180 MG tablet Take 180 mg by mouth daily       fluticasone (FLONASE) 50 MCG/ACT nasal spray Spray 1 spray into both nostrils daily as needed for rhinitis or allergies       levothyroxine (SYNTHROID/LEVOTHROID) 175 MCG tablet Take 175 mcg by mouth daily On Mondays and Thursdays       levothyroxine (SYNTHROID/LEVOTHROID) 200 MCG tablet Take 200 mcg by mouth daily On all days except Mondays and Thursdays       lidocaine (LIDODERM) 5 % patch Place 2 patches onto the skin as needed        loperamide (IMODIUM) 2 MG capsule Take 2 mg by mouth 4 times daily as needed for diarrhea       LORAZEPAM PO Take 1 mg by mouth as needed        metFORMIN (GLUCOPHAGE XR) 500 MG 24 hr tablet Take 2,000 mg by mouth daily (with dinner)       ondansetron (ZOFRAN) 4 MG tablet Take 4 mg by mouth every 6 hours as needed       pantoprazole (PROTONIX) 40 MG EC tablet  "Take 40 mg by mouth daily       pregabalin (LYRICA) 100 MG capsule Take 2 capsules (200 mg) by mouth every evening 60 capsule 5     rOPINIRole (REQUIP) 0.5 MG tablet Take 0.5 mg ropinirole with ropinirole 1 mg 3 tabs = 3.5 mg daily at 10-11 pm. 30 tablet 11     rOPINIRole (REQUIP) 1 MG tablet Take 1-1.5 mg by mouth at bedtime       senna-docusate (SENOKOT-S/PERICOLACE) 8.6-50 MG tablet Take 1 tablet by mouth daily as needed       traMADol (ULTRAM) 50 MG tablet Take 25-50 mg by mouth nightly as needed for severe pain       traZODone (DESYREL) 100 MG tablet Take 2 tablets by mouth nightly as needed        TRULICITY 1.5 MG/0.5ML pen Inject 1.5 mg Subcutaneous every 7 days       vitamin E 400 UNITS TABS Take 400 Units by mouth daily             Allergies   Allergen Reactions     Zolpidem Tartrate      SLEEP WALKING     Amoxicillin      Stomach upset     Bacitracin      Other reaction(s): *Unknown     Erythromycin      Other reaction(s): GI Upset     Morphine      Other reaction(s): flushed feeling, Flushing  Facial flushing       Zolpidem Other (See Comments)     Other reaction(s): Agitation, Confusion, Hallucinations  SLEEP WALKING  Sleep Walking            Physical Exam:  The patient's  blood pressure is 106/69 and his pulse is 69.        Data Reviewed: I have personally reviewed the tests/studies below.   - Iron level 48 (low)    Lab Results   Component Value Date    A1C 6.4 12/15/2023    A1C 6.1 10/14/2015    A1C 6.0 07/28/2014    A1C 6.4 03/09/2013    A1C 7.1@ 08/30/2012    A1C 7.1 07/18/2012     TSH   Date Value Ref Range Status   12/15/2023 8.45 (H) 0.30 - 4.20 uIU/mL Final   03/09/2013 4.48 0.4 - 5.0 mU/L Final     CBC RESULTS: No results for input(s): \"WBC\", \"RBC\", \"HGB\", \"HCT\", \"MCV\", \"MCH\", \"MCHC\", \"RDW\", \"PLT\" in the last 07715 hours.  Last Comprehensive Metabolic Panel:  Sodium   Date Value Ref Range Status   12/15/2023 136 135 - 145 mmol/L Final     Comment:     Reference intervals for this test were " updated on 09/26/2023 to more accurately reflect our healthy population. There may be differences in the flagging of prior results with similar values performed with this method. Interpretation of those prior results can be made in the context of the updated reference intervals.    10/17/2015 135 133 - 144 mmol/L Final     Potassium   Date Value Ref Range Status   12/15/2023 4.5 3.4 - 5.3 mmol/L Final   02/22/2022 3.4 (L) 3.5 - 5.0 mmol/L Final   10/17/2015 3.1 (L) 3.4 - 5.3 mmol/L Final     Chloride   Date Value Ref Range Status   12/15/2023 97 (L) 98 - 107 mmol/L Final   02/22/2022 96 (L) 98 - 107 mmol/L Final   10/17/2015 98 94 - 109 mmol/L Final     Carbon Dioxide   Date Value Ref Range Status   10/17/2015 28 20 - 32 mmol/L Final     Carbon Dioxide (CO2)   Date Value Ref Range Status   12/15/2023 28 22 - 29 mmol/L Final   02/22/2022 24 22 - 31 mmol/L Final     Anion Gap   Date Value Ref Range Status   12/15/2023 11 7 - 15 mmol/L Final   02/22/2022 12 5 - 18 mmol/L Final   10/17/2015 9 3 - 14 mmol/L Final     Glucose   Date Value Ref Range Status   12/15/2023 113 (H) 70 - 99 mg/dL Final   02/22/2022 143 (H) 70 - 125 mg/dL Final   10/17/2015 105 (H) 70 - 99 mg/dL Final     Urea Nitrogen   Date Value Ref Range Status   12/15/2023 6.5 (L) 8.0 - 23.0 mg/dL Final   02/22/2022 6 (L) 8 - 22 mg/dL Final   10/17/2015 12 7 - 30 mg/dL Final     Creatinine   Date Value Ref Range Status   12/15/2023 0.71 0.67 - 1.17 mg/dL Final   10/17/2015 0.75 0.66 - 1.25 mg/dL Final     GFR Estimate   Date Value Ref Range Status   12/15/2023 >90 >60 mL/min/1.73m2 Final   10/17/2015 >90  Non  GFR Calc   >60 mL/min/1.7m2 Final     Calcium   Date Value Ref Range Status   12/15/2023 10.2 8.8 - 10.2 mg/dL Final   10/17/2015 9.1 8.5 - 10.1 mg/dL Final     Bilirubin Total   Date Value Ref Range Status   12/15/2023 0.4 <=1.2 mg/dL Final   10/14/2015 0.4 0.2 - 1.3 mg/dL Final     Alkaline Phosphatase   Date Value Ref Range Status    12/15/2023 95 40 - 150 U/L Final     Comment:     Reference intervals for this test were updated on 11/14/2023 to more accurately reflect our healthy population. There may be differences in the flagging of prior results with similar values performed with this method. Interpretation of those prior results can be made in the context of the updated reference intervals.   10/14/2015 58 40 - 150 U/L Final     ALT   Date Value Ref Range Status   12/15/2023 5 0 - 70 U/L Final     Comment:     Reference intervals for this test were updated on 6/12/2023 to more accurately reflect our healthy population. There may be differences in the flagging of prior results with similar values performed with this method. Interpretation of those prior results can be made in the context of the updated reference intervals.     10/14/2015 20 0 - 70 U/L Final     AST   Date Value Ref Range Status   12/15/2023 17 0 - 45 U/L Final     Comment:     Reference intervals for this test were updated on 6/12/2023 to more accurately reflect our healthy population. There may be differences in the flagging of prior results with similar values performed with this method. Interpretation of those prior results can be made in the context of the updated reference intervals.   10/14/2015 14 0 - 45 U/L Final                        Again, thank you for allowing me to participate in the care of your patient.        Sincerely,        Miracle Mills DO

## 2024-06-12 NOTE — PROGRESS NOTES
Department of Neurology  Movement Disorders Division     Patient: Patrick Leal   MRN: [id number]   : 1955   Date of Visit: 2024    Impression:  Patrick Leal is a 68 year old male with Restless Leg Syndrome. The patient's main concern today is Restless Leg Syndrome symptoms. We discussed     Restless Leg Syndrome: Started pregabalin due to ropinirole causing quite a bit of sedation. Pregabalin has been quite helpful thus far and has been slowly cross titrating with plan to wean off ropinirole.   Insomnia     Plan:   - Increase pregabalin to 300 mg nightly.   - Stop ropinirole.  - If increased dose of pregabalin causes side effects, return to 250 mg and okay to restart ropinirole 0.5 mg 1 tab an hour prior to bed.   - For intolerable Restless Leg Syndrome symptoms, okay to take ropinirole 0.5 mg 1 tab nightly as a rescue medication.   - Will send Dr. Boles this note. Patrick will discuss with her regarding choosing an alternative medication to treat allergies other than an antihistamine as this may exacerbate Restless Leg Syndrome symptoms   - Avoid caffeine, antidepressants (except bupropion), antipsychotics, metoclopramide, centrally acting histamines   - Continue to stay active with moderate regular exercise  - Continue following with Dr. Tiffany Mcdaniel, PharmD, as needed     Patient to return in 6 months, for in-person visit, 30 minutes.     Miracle Mills DO  Movement Disorders Specialist  [facility]     Note dictated using voice recognition software.   15 minutes spent on date of encounter doing chart reviews and exam and documentation and further activities as noted above.        ------------------------------------------------------------------------------------------------------------      History of Present Illness  Mr. Leal is a pleasant 68 year old male that presents to Neurology Movement clinic for follow up regarding Restless Leg Syndrome management.   The  "patient was initially seen in neurologic consultation on 11/10/22 and most recently 4/3/24 for management of Restless Leg Syndrome. Please see the comprehensive neurologic consultation notes from those dates in the Epic records for details.      History obtained from patient.Main complaint:   Patient reports  Restless Leg Syndrome: Current Restless Leg Syndrome medication regimen is \"working pretty good\" but still could use improvement.  He reports night pain in legs presumably from Restless Leg Syndrome. He has Restless Leg Syndrome symptoms in bottoms of both feet. He treats Restless Leg Syndrome symptoms with the below medications and trmadol and lidocaine patches to bottoms of both feet, which all help.   Sleep: Okay. Loves caffeine and this may interfere with sleep. Takes trazodone to help him sleep.   Ropinirole dose: see below. He feels that he sleep more than he used to.    is 50 year wedding anniversary.   His mother  in 2024 and sometimes feels overwhelmed.   Movement Disorder-related Medications                   Indication 11 pm           Pregabalin 50 mg RLS 1       Pregabalin 200 mg  RLS 1           Ropinirole 0.5 mg   RLS 1           Ferrous sulfate 325 mg   1           Trazodone 200 mg  insomnia  1               Review of Systems:  Other than that mentioned above, the remainder of 12 systems reviewed were negative.    PMH: unchanged  PSH: unchanged  FH: unchanged  SH: unchanged    Medications:  Current Outpatient Medications   Medication Sig Dispense Refill    aspirin 81 MG EC tablet Take 81 mg by mouth daily      atorvastatin (LIPITOR) 10 MG tablet Take 1 tablet by mouth daily      blood glucose (OPTIUM TEST STRIPS) test strip Dispense item covered by pt ins. E11.9 NIDDM type II - Test 1 time/day      blood glucose monitoring (ACCU-CHEK FASTCLIX) lancets Use as directed. Test 1 times per day.      cephALEXin (KEFLEX) 500 MG capsule Take 500 mg by mouth 2 times daily      Cholecalciferol " (VITAMIN D) 2000 UNITS CAPS Take 1 capsule by mouth daily       chromium 200 MCG CAPS Take 200 mcg by mouth daily.      cinnamon 500 MG CAPS Take 1,000 mg by mouth 2 times daily       Contour Next EZ (CONTOUR NEXT EZ W/DEVICE KIT) w/Device KIT USE TO TEST BLOOD SUGARS 1 TIME DAILY.  E11.9 NIDDM TYPE II      cyanocobalamin (VITAMIN B-12) 1000 MCG tablet Take 1,000 mcg by mouth daily      diclofenac (VOLTAREN) 1 % GEL Place 4 g onto the skin 4 times daily      DULoxetine (CYMBALTA) 60 MG capsule Take 60 mg by mouth 2 times daily      estradiol (VIVELLE-DOT) 0.025 MG/24HR Place 1 patch onto the skin twice a week 8 patch 7    FEROSUL 325 (65 Fe) MG tablet Take 1 Tablet (325 mg) by mouth once daily. Take on an empty stomach or with orange or tomato juice.      fexofenadine (ALLEGRA) 180 MG tablet Take 180 mg by mouth daily      fluticasone (FLONASE) 50 MCG/ACT nasal spray Spray 1 spray into both nostrils daily as needed for rhinitis or allergies      levothyroxine (SYNTHROID/LEVOTHROID) 175 MCG tablet Take 175 mcg by mouth daily On Mondays and Thursdays      levothyroxine (SYNTHROID/LEVOTHROID) 200 MCG tablet Take 200 mcg by mouth daily On all days except Mondays and Thursdays      lidocaine (LIDODERM) 5 % patch Place 2 patches onto the skin as needed       loperamide (IMODIUM) 2 MG capsule Take 2 mg by mouth 4 times daily as needed for diarrhea      LORAZEPAM PO Take 1 mg by mouth as needed       metFORMIN (GLUCOPHAGE XR) 500 MG 24 hr tablet Take 2,000 mg by mouth daily (with dinner)      ondansetron (ZOFRAN) 4 MG tablet Take 4 mg by mouth every 6 hours as needed      pantoprazole (PROTONIX) 40 MG EC tablet Take 40 mg by mouth daily      pregabalin (LYRICA) 100 MG capsule Take 2 capsules (200 mg) by mouth every evening 60 capsule 5    rOPINIRole (REQUIP) 0.5 MG tablet Take 0.5 mg ropinirole with ropinirole 1 mg 3 tabs = 3.5 mg daily at 10-11 pm. 30 tablet 11    rOPINIRole (REQUIP) 1 MG tablet Take 1-1.5 mg by mouth at  "bedtime      senna-docusate (SENOKOT-S/PERICOLACE) 8.6-50 MG tablet Take 1 tablet by mouth daily as needed      traMADol (ULTRAM) 50 MG tablet Take 25-50 mg by mouth nightly as needed for severe pain      traZODone (DESYREL) 100 MG tablet Take 2 tablets by mouth nightly as needed       TRULICITY 1.5 MG/0.5ML pen Inject 1.5 mg Subcutaneous every 7 days      vitamin E 400 UNITS TABS Take 400 Units by mouth daily             Allergies   Allergen Reactions    Zolpidem Tartrate      SLEEP WALKING    Amoxicillin      Stomach upset    Bacitracin      Other reaction(s): *Unknown    Erythromycin      Other reaction(s): GI Upset    Morphine      Other reaction(s): flushed feeling, Flushing  Facial flushing      Zolpidem Other (See Comments)     Other reaction(s): Agitation, Confusion, Hallucinations  SLEEP WALKING  Sleep Walking            Physical Exam:  The patient's  blood pressure is 106/69 and his pulse is 69.        Data Reviewed: I have personally reviewed the tests/studies below.   - Iron level 48 (low)    Lab Results   Component Value Date    A1C 6.4 12/15/2023    A1C 6.1 10/14/2015    A1C 6.0 07/28/2014    A1C 6.4 03/09/2013    A1C 7.1@ 08/30/2012    A1C 7.1 07/18/2012     TSH   Date Value Ref Range Status   12/15/2023 8.45 (H) 0.30 - 4.20 uIU/mL Final   03/09/2013 4.48 0.4 - 5.0 mU/L Final     CBC RESULTS: No results for input(s): \"WBC\", \"RBC\", \"HGB\", \"HCT\", \"MCV\", \"MCH\", \"MCHC\", \"RDW\", \"PLT\" in the last 55438 hours.  Last Comprehensive Metabolic Panel:  Sodium   Date Value Ref Range Status   12/15/2023 136 135 - 145 mmol/L Final     Comment:     Reference intervals for this test were updated on 09/26/2023 to more accurately reflect our healthy population. There may be differences in the flagging of prior results with similar values performed with this method. Interpretation of those prior results can be made in the context of the updated reference intervals.    10/17/2015 135 133 - 144 mmol/L Final     Potassium "   Date Value Ref Range Status   12/15/2023 4.5 3.4 - 5.3 mmol/L Final   02/22/2022 3.4 (L) 3.5 - 5.0 mmol/L Final   10/17/2015 3.1 (L) 3.4 - 5.3 mmol/L Final     Chloride   Date Value Ref Range Status   12/15/2023 97 (L) 98 - 107 mmol/L Final   02/22/2022 96 (L) 98 - 107 mmol/L Final   10/17/2015 98 94 - 109 mmol/L Final     Carbon Dioxide   Date Value Ref Range Status   10/17/2015 28 20 - 32 mmol/L Final     Carbon Dioxide (CO2)   Date Value Ref Range Status   12/15/2023 28 22 - 29 mmol/L Final   02/22/2022 24 22 - 31 mmol/L Final     Anion Gap   Date Value Ref Range Status   12/15/2023 11 7 - 15 mmol/L Final   02/22/2022 12 5 - 18 mmol/L Final   10/17/2015 9 3 - 14 mmol/L Final     Glucose   Date Value Ref Range Status   12/15/2023 113 (H) 70 - 99 mg/dL Final   02/22/2022 143 (H) 70 - 125 mg/dL Final   10/17/2015 105 (H) 70 - 99 mg/dL Final     Urea Nitrogen   Date Value Ref Range Status   12/15/2023 6.5 (L) 8.0 - 23.0 mg/dL Final   02/22/2022 6 (L) 8 - 22 mg/dL Final   10/17/2015 12 7 - 30 mg/dL Final     Creatinine   Date Value Ref Range Status   12/15/2023 0.71 0.67 - 1.17 mg/dL Final   10/17/2015 0.75 0.66 - 1.25 mg/dL Final     GFR Estimate   Date Value Ref Range Status   12/15/2023 >90 >60 mL/min/1.73m2 Final   10/17/2015 >90  Non  GFR Calc   >60 mL/min/1.7m2 Final     Calcium   Date Value Ref Range Status   12/15/2023 10.2 8.8 - 10.2 mg/dL Final   10/17/2015 9.1 8.5 - 10.1 mg/dL Final     Bilirubin Total   Date Value Ref Range Status   12/15/2023 0.4 <=1.2 mg/dL Final   10/14/2015 0.4 0.2 - 1.3 mg/dL Final     Alkaline Phosphatase   Date Value Ref Range Status   12/15/2023 95 40 - 150 U/L Final     Comment:     Reference intervals for this test were updated on 11/14/2023 to more accurately reflect our healthy population. There may be differences in the flagging of prior results with similar values performed with this method. Interpretation of those prior results can be made in the context of  the updated reference intervals.   10/14/2015 58 40 - 150 U/L Final     ALT   Date Value Ref Range Status   12/15/2023 5 0 - 70 U/L Final     Comment:     Reference intervals for this test were updated on 6/12/2023 to more accurately reflect our healthy population. There may be differences in the flagging of prior results with similar values performed with this method. Interpretation of those prior results can be made in the context of the updated reference intervals.     10/14/2015 20 0 - 70 U/L Final     AST   Date Value Ref Range Status   12/15/2023 17 0 - 45 U/L Final     Comment:     Reference intervals for this test were updated on 6/12/2023 to more accurately reflect our healthy population. There may be differences in the flagging of prior results with similar values performed with this method. Interpretation of those prior results can be made in the context of the updated reference intervals.   10/14/2015 14 0 - 45 U/L Final

## 2024-06-12 NOTE — Clinical Note
JULIA. Doing well overall. Increasing dose of pregabalin to 300 mg at bedtime to see if we can improve Restless Leg Syndrome symptoms that much more.   Miracle

## 2024-06-12 NOTE — PATIENT INSTRUCTIONS
Plan:   - Increase pregabalin to 300 mg nightly.   - Stop ropinirole.  - If increased dose of pregabalin causes side effects, return to 250 mg and okay to restart ropinirole 0.5 mg 1 tab an hour prior to bed.   - For intolerable Restless Leg Syndrome symptoms, okay to take ropinirole 0.5 mg 1 tab nightly as a rescue medication.   - Will send Dr. Boles this note. Patrick will discuss with her regarding choosing an alternative medication to treat allergies other than an antihistamine as this may exacerbate Restless Leg Syndrome symptoms   - Avoid caffeine, antidepressants (except bupropion), antipsychotics, metoclopramide, centrally acting histamines   - Continue to stay active with moderate regular exercise    Patient to return in 6 months, for in-person visit, 30 minutes.

## 2024-08-05 NOTE — TELEPHONE ENCOUNTER
[facility] Call Center    Phone Message    May a detailed message be left on voicemail: yes     Reason for Call: Other: [facility] is requesting a call back to further discuss medication plan.   Pts PCP would like for Dr. Mills to prescribe pregabalin 100mg twice daily. Pharmacy states that patient has been more uncomfortable.    Please follow up to further discuss.   [facility] [phone]    Action Taken: Other: Neurology    Travel Screening: Not Applicable                                                                     Patient has an appointment with Dr. Roger Diallo  on 8/6/24. Can we get an order/referral for this visit please? Thank you    Samreen Lopez  Referral Specialist

## 2024-08-08 ENCOUNTER — VIRTUAL VISIT (OUTPATIENT)
Dept: NEUROLOGY | Facility: CLINIC | Age: 69
End: 2024-08-08
Attending: PSYCHIATRY & NEUROLOGY
Payer: COMMERCIAL

## 2024-08-08 DIAGNOSIS — G47.00 INSOMNIA, UNSPECIFIED TYPE: ICD-10-CM

## 2024-08-08 DIAGNOSIS — G25.81 RESTLESS LEG SYNDROME: Primary | ICD-10-CM

## 2024-08-08 DIAGNOSIS — F33.0 MILD EPISODE OF RECURRENT MAJOR DEPRESSIVE DISORDER (H): ICD-10-CM

## 2024-08-08 RX ORDER — PREGABALIN 200 MG/1
200 CAPSULE ORAL AT BEDTIME
COMMUNITY
End: 2024-08-14

## 2024-08-08 NOTE — Clinical Note
Patrick is doing better. RLS is practically a non-issue. Has upcoming visit with psychiatry. He did decrease pregabalin to 200mg nightly--could you please send a new Rx since it is controlled? Thanks! Tiffany

## 2024-08-08 NOTE — Clinical Note
8/8/2024       RE: Patrick Leal  [address]  [facility] [place] [address]     Dear Colleague,    Thank you for referring your patient, Patrick Leal, to the [facility] [place] at [facility]. Please see a copy of my visit note below.    Medication Therapy Management (MTM) Encounter    ASSESSMENT:                            Medication Adherence/Access: {adherencechoices:713766}    ***:   ***    PLAN:                            ***    Follow-up: {followuptest2:936520}    SUBJECTIVE/OBJECTIVE:                          Patrick Leal is a 69 year old male seen for a follow-up visit. {mtmvisitdetails:275343}      Reason for visit: med check; mood symptoms.    Allergies/ADRs: Reviewed in chart  Past Medical History: Reviewed in chart  Tobacco: He reports that he has never smoked. He has never used smokeless tobacco.    Alcohol: rarely  Caffeine:  large fountain Pepsi daily (50-60oz), sometimes additional bottle    Has been trying to reduce to 24oz daily, though sometimes has more    Medication Adherence/Access: {fumedadherence:740228}    {MTM SUBJECTIVE (Optional):470329}    Restless legs/Sleep:   -pregabalin 250mg at bedtime  -ropinirole 1.5mg at bedtime  -ferrous sulfate 325mg at bedtime  -trazodone 200mg nightly    At last visit on ***, he elected to increase pregabalin from 250mg to 300mg night and we reduced ropinirole from 1.5mg to 1mg nightly.  Since then, he sent message reporting worsened depression since pregabalin was increased.         Since last visit, he saw Dr. Mills on 4/3/24, at which time:  - Continue to wean off ropinirole by decreasing b 0.5 mg every 5-7 days until off  - If Restless Leg Syndrome symptoms worsen, increase pregabalin by 50 mg weekly until 300 mg nightly or a lowest effective dose. Please update the clinic if you decide to increase the dose.     Today, he states that cross titration from  "ropinirole to pregabalin has been challenging due to variability in symptoms. He'd recently been trying to get to 1mg of ropinirole, but noticed a lot more pain in his legs, so has continued at 1.5mg for this week. If pain is very severe, he may also take a tramadol. Has been noticing some symptoms when resting after lunch and dinner on some days.   Can often get to bed ok initially, but this does not seem to follow any kind of pattern. Has been feeling a bit lightheaded at times.     He's been working on reducing soda intake, sometimes trying to have a single 24oz bottle of Pepsi for the day, though will have more when he goes out to lunch with a friend (was previously having 50-60oz per day, but no longer going to the fountain at [employer] as much).    ***:   ***    Today's Vitals: There were no vitals taken for this visit.  ----------------  {JEANINE?:262848}    I spent {mt total time 3:633886} with this patient today{MTMpartdbillingquestion:038590}. { :976010}. A copy of the visit note was provided to the patient's provider(s).    A summary of these recommendations {GIVEN/NOT GIVEN:423437}.    ***    Telemedicine Visit Details  Type of service:  {telemedvisitmtm:066935::\"Telephone visit\"}  Start Time: {video/phone visit start time:152948}  End Time: {video/phone visit end time:152948}     Medication Therapy Recommendations  No medication therapy recommendations to display     Medication Therapy Management (MTM) Encounter    ASSESSMENT:                            Medication Adherence/Access: No issues identified    Restless legs/Sleep/Depression:   Restless legs are stable and sleep is ok. Will plan to continue current medications.  Suggested he reconnect with therapist or discuss at upcoming psychiatry appointment to get set up with new therapy provider. He will consider. Discussed many stressors recently and therapist may be more helpful than meds to help process life changes.   Also discussed trying the Glucerna in " the afternoon, after pool therapy or trying a different brand, such as Premier Protein. Looked it up with him during the visit, sold at major retailers. He will consider it.    PLAN:                            Continue current medication.  -try the Glucerna in the afternoon or try Premier Protein brand, found at [employer], [employer], [facility], etc.  -talk with psychiatry about connecting with new therapist or if med changes would be helpful    Follow-up: 6 months or sooner if needed  Psychiatry in September    SUBJECTIVE/OBJECTIVE:                          Patrick Leal is a 69 year old male seen for a follow-up visit.       Reason for visit: med check; mood symptoms.    Allergies/ADRs: Reviewed in chart  Past Medical History: Reviewed in chart  Tobacco: He reports that he has never smoked. He has never used smokeless tobacco.  Alcohol: rarely  Caffeine:  2x 20oz bottles of wild cherry Diet Pepsi, plus additional caffeine-free [employer] rootbeer  -previously drinking up to 60oz per day    Medication Adherence/Access: no issues reported    Restless legs/Sleep/Depression:   -pregabalin 200mg at bedtime (reduced from 300mg)  -ferrous sulfate 325mg at bedtime  -trazodone 200mg nightly    At last visit on 5/3/24, he elected to increase pregabalin from 200mg to 250mg night and we reduced ropinirole from 1.5mg to 1mg nightly.  He saw Neurology on 6/12, at which time ropinirole was discontinued and pregabalin was increased to 300mg.  Since then, he sent message on 7/22 reporting worsened depression in the previous weeks that he wondered if could be related to pregabalin.     Today, he reported that the increased pregabalin had also caused him to be more fatigued during the day, so he reduced back to 200mg, which helped a bit, but still low energy. Has not had any issues with restless legs for quite a while now. He does wake during the night at times, thinks he sleeps ok.  He stated that mood is feeling better than it had been when  "he sent [employer] message a couple weeks ago, though still more depressed than 6 months ago, mostly feeling low energy and motivation. He described having trouble with eating protein, which he knows also affects his energy. \"It just doesn't sound good.\" He tried Glucerna for supplement in the morning, but seems to cause loose stools, which he doesn't want to happen during pool therapy.     He is moving on Saturday and has had more stressors recently. His mother passed away in February and good friend's health has been in decline.  He had talked to a therapist every 2-3 months, which was mildly helpful, but hasn't seen her for a while. Might consider restarting.     ----------------      I spent 30 minutes with this patient today. A copy of the visit note was provided to the patient's provider(s).    A summary of these recommendations was sent via clinic portal.    Tiffany Mcdaniel PharmD  Medication Therapy Management Pharmacist  [facility]    Telemedicine Visit Details  Type of service:  Telephone visit  Start Time:  10:00am  End Time:  10:30am     Medication Therapy Recommendations  No medication therapy recommendations to display       Again, thank you for allowing me to participate in the care of your patient.      Sincerely,    Tiffany Mcdaniel PharmD    "

## 2024-08-08 NOTE — PATIENT INSTRUCTIONS
"Recommendations from today's MTM visit:                                                       Continue current medication.  -try the Glucerna in the afternoon or try Premier Protein brand, found at [employer], [employer], [facility], etc.  -talk with psychiatry about connecting with new therapist or if med changes would be helpful    Follow-up: 6 months or sooner if needed  Psychiatry in September    It was great speaking with you today.  I value your experience and would be very thankful for your time in providing feedback in our clinic survey. In the next few days, you may receive an email or text message from [employer] with a link to a survey related to your  clinical pharmacist.\"     To schedule another MTM appointment, please call the clinic directly or you may call the MTM scheduling line at [phone].    My Clinical Pharmacist's contact information:                                                      Please feel free to contact me with any questions or concerns you have.      Tiffany Mcdaniel, PharmD  Medication Therapy Management Pharmacist  [facility]    "

## 2024-08-08 NOTE — PROGRESS NOTES
Medication Therapy Management (MTM) Encounter    ASSESSMENT:                            Medication Adherence/Access: No issues identified    Restless legs/Sleep/Depression:   Restless legs are stable and sleep is ok. Will plan to continue current medications.  Suggested he reconnect with therapist or discuss at upcoming psychiatry appointment to get set up with new therapy provider. He will consider. Discussed many stressors recently and therapist may be more helpful than meds to help process life changes.   Also discussed trying the Glucerna in the afternoon, after pool therapy or trying a different brand, such as Premier Protein. Looked it up with him during the visit, sold at [email or web address] retailers. He will consider it.    PLAN:                            Continue current medication.  -try the Glucerna in the afternoon or try Premier Protein brand, found at [employer], [employer], [facility], etc.  -talk with psychiatry about connecting with new therapist or if med changes would be helpful    Follow-up: 6 months or sooner if needed  Psychiatry in September    SUBJECTIVE/OBJECTIVE:                          Patrick Leal is a 69 year old male seen for a follow-up visit.       Reason for visit: med check; mood symptoms.    Allergies/ADRs: Reviewed in chart  Past Medical History: Reviewed in chart  Tobacco: He reports that he has never smoked. He has never used smokeless tobacco.  Alcohol: rarely  Caffeine:  2x 20oz bottles of wild cherry Diet Pepsi, plus additional caffeine-free A&W rootbeer  -previously drinking up to 60oz per day    Medication Adherence/Access: no issues reported    Restless legs/Sleep/Depression:   -pregabalin 200mg at bedtime (reduced from 300mg)  -ropinirole 0.5-1mg as needed (not using)  -ferrous sulfate 325mg at bedtime  -trazodone 200mg nightly    At last visit on 5/3/24, he elected to increase pregabalin from 200mg to 250mg night and we reduced ropinirole from 1.5mg to 1mg nightly.  He saw Neurology on  "6/12, at which time ropinirole was discontinued and pregabalin was increased to 300mg.  Since then, he sent message on 7/22 reporting worsened depression in the previous weeks that he wondered if could be related to pregabalin.     Today, he reported that the increased pregabalin had also caused him to be more fatigued during the day, so he reduced back to 200mg, which helped a bit, but still low energy. Has not had any issues with restless legs for quite a while now. He does wake during the night at times, thinks he sleeps ok.  He stated that mood is feeling better than it had been when he sent [email or web address] message a couple weeks ago, though still more depressed than 6 months ago, mostly feeling low energy and motivation. He described having trouble with eating protein, which he knows also affects his energy. \"It just doesn't sound good.\" He tried Glucerna for supplement in the morning, but seems to cause loose stools, which he doesn't want to happen during pool therapy.     He is moving on Saturday and has had more stressors recently. His mother passed away in February and good friend's health has been in decline.  He had talked to a therapist every 2-3 months, which was mildly helpful, but hasn't seen her for a while. Might consider restarting.     ----------------      I spent 30 minutes with this patient today. A copy of the visit note was provided to the patient's provider(s).    A summary of these recommendations was sent via clinic portal.    Tiffany Mcdaniel, PharmD  Medication Therapy Management Pharmacist  [facility]    Telemedicine Visit Details  Type of service:  Telephone visit  Start Time:  10:00am  End Time:  10:30am     Medication Therapy Recommendations  No medication therapy recommendations to display   "

## 2024-08-10 ENCOUNTER — HEALTH MAINTENANCE LETTER (OUTPATIENT)
Age: 69
End: 2024-08-10

## 2024-08-14 DIAGNOSIS — G25.81 RESTLESS LEG SYNDROME: Primary | ICD-10-CM

## 2024-08-14 RX ORDER — PREGABALIN 200 MG/1
200 CAPSULE ORAL AT BEDTIME
Qty: 90 CAPSULE | Refills: 3 | Status: SHIPPED | OUTPATIENT
Start: 2024-08-14

## 2024-12-22 ENCOUNTER — HEALTH MAINTENANCE LETTER (OUTPATIENT)
Age: 69
End: 2024-12-22

## 2025-02-22 ENCOUNTER — HEALTH MAINTENANCE LETTER (OUTPATIENT)
Age: 70
End: 2025-02-22

## 2025-04-12 ENCOUNTER — HEALTH MAINTENANCE LETTER (OUTPATIENT)
Age: 70
End: 2025-04-12

## 2025-07-26 ENCOUNTER — HEALTH MAINTENANCE LETTER (OUTPATIENT)
Age: 70
End: 2025-07-26